# Patient Record
Sex: FEMALE | Race: BLACK OR AFRICAN AMERICAN | Employment: FULL TIME | URBAN - METROPOLITAN AREA
[De-identification: names, ages, dates, MRNs, and addresses within clinical notes are randomized per-mention and may not be internally consistent; named-entity substitution may affect disease eponyms.]

---

## 2020-04-18 ENCOUNTER — HOSPITAL ENCOUNTER (EMERGENCY)
Age: 35
Discharge: HOME | End: 2020-04-18
Attending: EMERGENCY MEDICINE
Payer: COMMERCIAL

## 2020-04-18 VITALS
SYSTOLIC BLOOD PRESSURE: 133 MMHG | HEART RATE: 97 BPM | WEIGHT: 144.4 LBS | OXYGEN SATURATION: 98 % | RESPIRATION RATE: 15 BRPM | DIASTOLIC BLOOD PRESSURE: 72 MMHG | TEMPERATURE: 98.9 F

## 2020-04-18 LAB
AMORPHOUS: ABNORMAL /HPF
BACTERIA WET PREP: ABNORMAL
BACTERIA: ABNORMAL /HPF
BASOPHILS ABSOLUTE: 0 K/UL (ref 0–0.2)
BASOPHILS RELATIVE PERCENT: 0.2 %
BILIRUBIN URINE: ABNORMAL
BLOOD, URINE: ABNORMAL
CLARITY: CLEAR
CLUE CELLS: ABNORMAL
COLOR: ABNORMAL
EOSINOPHILS ABSOLUTE: 0.2 K/UL (ref 0–0.6)
EOSINOPHILS RELATIVE PERCENT: 1.8 %
EPITHELIAL CELLS WET PREP: ABNORMAL
EPITHELIAL CELLS, UA: ABNORMAL /HPF (ref 0–5)
GLUCOSE URINE: NEGATIVE MG/DL
GONADOTROPIN, CHORIONIC (HCG) QUANT: NORMAL MIU/ML
HCG(URINE) PREGNANCY TEST: POSITIVE
HCT VFR BLD CALC: 32.6 % (ref 36–48)
HEMOGLOBIN: 10.7 G/DL (ref 12–16)
KETONES, URINE: >=80 MG/DL
LEUKOCYTE ESTERASE, URINE: NEGATIVE
LYMPHOCYTES ABSOLUTE: 2 K/UL (ref 1–5.1)
LYMPHOCYTES RELATIVE PERCENT: 20.7 %
MCH RBC QN AUTO: 25.9 PG (ref 26–34)
MCHC RBC AUTO-ENTMCNC: 32.9 G/DL (ref 31–36)
MCV RBC AUTO: 78.8 FL (ref 80–100)
MICROSCOPIC EXAMINATION: YES
MONOCYTES ABSOLUTE: 0.9 K/UL (ref 0–1.3)
MONOCYTES RELATIVE PERCENT: 8.8 %
MUCUS: ABNORMAL /LPF
NEUTROPHILS ABSOLUTE: 6.6 K/UL (ref 1.7–7.7)
NEUTROPHILS RELATIVE PERCENT: 68.5 %
NITRITE, URINE: POSITIVE
PDW BLD-RTO: 17.6 % (ref 12.4–15.4)
PH UA: 6 (ref 5–8)
PLATELET # BLD: 157 K/UL (ref 135–450)
PMV BLD AUTO: 9.3 FL (ref 5–10.5)
PROTEIN UA: 100 MG/DL
RBC # BLD: 4.14 M/UL (ref 4–5.2)
RBC UA: ABNORMAL /HPF (ref 0–4)
RBC WET PREP: ABNORMAL
SOURCE WET PREP: ABNORMAL
SPECIFIC GRAVITY UA: >=1.03 (ref 1–1.03)
TRICHOMONAS PREP: ABNORMAL
URINE TYPE: ABNORMAL
UROBILINOGEN, URINE: 1 E.U./DL
WBC # BLD: 9.6 K/UL (ref 4–11)
WBC UA: ABNORMAL /HPF (ref 0–5)
WBC WET PREP: ABNORMAL
YEAST WET PREP: ABNORMAL

## 2020-04-18 PROCEDURE — 2580000003 HC RX 258: Performed by: EMERGENCY MEDICINE

## 2020-04-18 PROCEDURE — 87210 SMEAR WET MOUNT SALINE/INK: CPT

## 2020-04-18 PROCEDURE — 87086 URINE CULTURE/COLONY COUNT: CPT

## 2020-04-18 PROCEDURE — 81001 URINALYSIS AUTO W/SCOPE: CPT

## 2020-04-18 PROCEDURE — 87491 CHLMYD TRACH DNA AMP PROBE: CPT

## 2020-04-18 PROCEDURE — 84702 CHORIONIC GONADOTROPIN TEST: CPT

## 2020-04-18 PROCEDURE — 87591 N.GONORRHOEAE DNA AMP PROB: CPT

## 2020-04-18 PROCEDURE — 85025 COMPLETE CBC W/AUTO DIFF WBC: CPT

## 2020-04-18 PROCEDURE — 99284 EMERGENCY DEPT VISIT MOD MDM: CPT

## 2020-04-18 PROCEDURE — 84703 CHORIONIC GONADOTROPIN ASSAY: CPT

## 2020-04-18 RX ORDER — 0.9 % SODIUM CHLORIDE 0.9 %
1000 INTRAVENOUS SOLUTION INTRAVENOUS ONCE
Status: COMPLETED | OUTPATIENT
Start: 2020-04-18 | End: 2020-04-18

## 2020-04-18 RX ORDER — DOXYLAMINE SUCCINATE AND PYRIDOXINE HYDROCHLORIDE, DELAYED RELEASE TABLETS 10 MG/10 MG 10; 10 MG/1; MG/1
2 TABLET, DELAYED RELEASE ORAL NIGHTLY PRN
Qty: 30 TABLET | Refills: 1 | Status: SHIPPED | OUTPATIENT
Start: 2020-04-18 | End: 2020-05-18

## 2020-04-18 RX ADMIN — SODIUM CHLORIDE 1000 ML: 0.9 INJECTION, SOLUTION INTRAVENOUS at 12:55

## 2020-04-18 ASSESSMENT — PAIN DESCRIPTION - ORIENTATION: ORIENTATION: LOWER

## 2020-04-18 ASSESSMENT — PAIN DESCRIPTION - LOCATION: LOCATION: ABDOMEN

## 2020-04-18 ASSESSMENT — PAIN SCALES - GENERAL: PAINLEVEL_OUTOF10: 3

## 2020-04-18 ASSESSMENT — PAIN DESCRIPTION - DESCRIPTORS: DESCRIPTORS: CONSTANT

## 2020-04-18 ASSESSMENT — PAIN DESCRIPTION - PAIN TYPE: TYPE: ACUTE PAIN

## 2020-04-18 NOTE — ED NOTES
Pt reports had a migraine on Monday with NV took a pregnancy test on Tues that was positive and medicated self for migraine with relief.  Kept having nausea and now has vaginal bleeding       Nemesio Mosquera RN  04/18/20 8186

## 2020-04-18 NOTE — ED PROVIDER NOTES
TRIAGE CHIEF COMPLAINT:   Chief Complaint   Patient presents with    Nausea       HPI: Inga Gongora is a 27 y.o. female who presents to the emergency department with complaint of nausea, positive pregnancy test and possible vaginal bleeding. Patient is G5, . Her last normal period was the end of February. For about 2 weeks she has had some vague nausea but thought this was mainly because she has been under stress while working at home. She decided to do a pregnancy test 4 days ago which was positive. Early last week she had a migraine headache associated with some vomiting but after taking Tylenol, ibuprofen and a triptan medication the headache resolved. She still has some nausea. This morning she noted some slight red vaginal spotting. She states she may have some slight urinary frequency but denies dysuria or flank pain. No fever or chills. She states her appetite is been decreased for about 2 weeks. She had some transient diarrhea 5 days ago. Denies current abdominal pain or pelvic pain. The patient last had vaginal delivery in 2018. She sees Dr. Annel Orellana OB/GYN at [address]:   10 systems reviewed. Pertinent positives per HPI. Otherwise noted to be negative. I have reviewed the triage/nursing documentation and agree unless otherwise noted below. PAST MEDICAL HISTORY:   Past Medical History:   Diagnosis Date    Anemia     Migraine         CURRENT MEDICATIONS:   Patient's Medications    No medications on file        SURGICAL HISTORY:   History reviewed. No pertinent surgical history. FAMILY HISTORY:   History reviewed. No pertinent family history. SOCIAL HISTORY:    reports that she has never smoked. She has never used smokeless tobacco. She reports previous alcohol use. She reports previous drug use.     ALLERGIES: No Known Allergies    PHYSICAL EXAM:  VITAL SIGNS: /72   Pulse 97   Temp 98.9 °F (37.2 °C) (Oral)   Resp 15   Wt 65.5 kg (144 at:  [facility]  [address]   Phone [phone]   MICROSCOPIC URINALYSIS - Abnormal; Notable for the following components:    Mucus, UA 3+ (*)     WBC, UA 6-10 (*)     Epithelial Cells, UA 6-10 (*)     Bacteria, UA 3+ (*)     All other components within normal limits    Narrative:     Performed at:  [facility]  [address]   Phone [phone]   CBC WITH AUTO DIFFERENTIAL - Abnormal; Notable for the following components:    Hemoglobin 10.7 (*)     Hematocrit 32.6 (*)     MCV 78.8 (*)     MCH 25.9 (*)     RDW 17.6 (*)     All other components within normal limits    Narrative:     Performed at:  [facility]  [address]   Phone [phone]   C. TRACHOMATIS N.GONORRHOEAE DNA   CULTURE, URINE   PREGNANCY, URINE    Narrative:     Performed at:  [facility]  [address]   Phone [phone]   HCG, QUANTITATIVE, PREGNANCY    Narrative:     Performed at:  [facility]  [address]   Phone [phone]       ED COURSE & MEDICAL DECISION MAKING:  Pertinent Labs & Imaging studies reviewed. (See chart for details)  67-year-old female, G5, , last normal period the end of February with 2-week history of some vague nausea with decreased appetite. She has some questionable urinary frequency but no dysuria or flank pain. She had a migraine headache 5 days ago which resolved. She still has nausea. The patient noted some slight vaginal spotting this morning. She has no pelvic or abdominal pain. No cramping. Pelvic exam shows no evidence of blood. Cervix is closed and is nontender. No adnexal mass or tenderness. No uterine tenderness. She is hemodynamically stable.   CBC shows

## 2020-04-20 ENCOUNTER — TELEPHONE (OUTPATIENT)
Dept: SURGERY | Age: 35
End: 2020-04-20

## 2020-04-20 LAB — URINE CULTURE, ROUTINE: NORMAL

## 2020-04-20 NOTE — TELEPHONE ENCOUNTER
Called Patient for follow up From ED for COVID risk call. NA, LM for patient to CB left return call number. Will attempt to reach Patient again tomorrow.

## 2020-04-21 ENCOUNTER — TELEPHONE (OUTPATIENT)
Dept: SURGERY | Age: 35
End: 2020-04-21

## 2020-04-21 LAB
C TRACH DNA GENITAL QL NAA+PROBE: NEGATIVE
N. GONORRHOEAE DNA: NEGATIVE

## 2021-02-10 LAB — PAP SMEAR, EXTERNAL: NORMAL

## 2022-03-10 ENCOUNTER — OFFICE VISIT (OUTPATIENT)
Dept: PRIMARY CARE CLINIC | Age: 37
End: 2022-03-10
Payer: COMMERCIAL

## 2022-03-10 VITALS
HEIGHT: 62 IN | RESPIRATION RATE: 16 BRPM | OXYGEN SATURATION: 96 % | DIASTOLIC BLOOD PRESSURE: 72 MMHG | WEIGHT: 142.8 LBS | TEMPERATURE: 98.1 F | SYSTOLIC BLOOD PRESSURE: 110 MMHG | HEART RATE: 80 BPM | BODY MASS INDEX: 26.28 KG/M2

## 2022-03-10 DIAGNOSIS — T83.89XA MENORRHAGIA DUE TO INTRAUTERINE DEVICE (IUD) (HCC): ICD-10-CM

## 2022-03-10 DIAGNOSIS — R22.2 MASS ON BACK: Primary | ICD-10-CM

## 2022-03-10 DIAGNOSIS — M25.512 LEFT SHOULDER PAIN, UNSPECIFIED CHRONICITY: ICD-10-CM

## 2022-03-10 DIAGNOSIS — N92.0 MENORRHAGIA DUE TO INTRAUTERINE DEVICE (IUD) (HCC): ICD-10-CM

## 2022-03-10 DIAGNOSIS — M54.2 NECK PAIN: ICD-10-CM

## 2022-03-10 PROCEDURE — 99204 OFFICE O/P NEW MOD 45 MIN: CPT | Performed by: INTERNAL MEDICINE

## 2022-03-10 RX ORDER — IBUPROFEN 600 MG/1
600 TABLET ORAL 3 TIMES DAILY PRN
COMMUNITY
Start: 2022-03-10

## 2022-03-10 SDOH — ECONOMIC STABILITY: FOOD INSECURITY: WITHIN THE PAST 12 MONTHS, YOU WORRIED THAT YOUR FOOD WOULD RUN OUT BEFORE YOU GOT MONEY TO BUY MORE.: NEVER TRUE

## 2022-03-10 SDOH — ECONOMIC STABILITY: FOOD INSECURITY: WITHIN THE PAST 12 MONTHS, THE FOOD YOU BOUGHT JUST DIDN'T LAST AND YOU DIDN'T HAVE MONEY TO GET MORE.: NEVER TRUE

## 2022-03-10 ASSESSMENT — PATIENT HEALTH QUESTIONNAIRE - PHQ9
SUM OF ALL RESPONSES TO PHQ QUESTIONS 1-9: 0
SUM OF ALL RESPONSES TO PHQ QUESTIONS 1-9: 0
3. TROUBLE FALLING OR STAYING ASLEEP: 0
9. THOUGHTS THAT YOU WOULD BE BETTER OFF DEAD, OR OF HURTING YOURSELF: 0
SUM OF ALL RESPONSES TO PHQ QUESTIONS 1-9: 0
8. MOVING OR SPEAKING SO SLOWLY THAT OTHER PEOPLE COULD HAVE NOTICED. OR THE OPPOSITE, BEING SO FIGETY OR RESTLESS THAT YOU HAVE BEEN MOVING AROUND A LOT MORE THAN USUAL: 0
5. POOR APPETITE OR OVEREATING: 0
2. FEELING DOWN, DEPRESSED OR HOPELESS: 0
6. FEELING BAD ABOUT YOURSELF - OR THAT YOU ARE A FAILURE OR HAVE LET YOURSELF OR YOUR FAMILY DOWN: 0
7. TROUBLE CONCENTRATING ON THINGS, SUCH AS READING THE NEWSPAPER OR WATCHING TELEVISION: 0
SUM OF ALL RESPONSES TO PHQ QUESTIONS 1-9: 0
SUM OF ALL RESPONSES TO PHQ9 QUESTIONS 1 & 2: 0
4. FEELING TIRED OR HAVING LITTLE ENERGY: 0
10. IF YOU CHECKED OFF ANY PROBLEMS, HOW DIFFICULT HAVE THESE PROBLEMS MADE IT FOR YOU TO DO YOUR WORK, TAKE CARE OF THINGS AT HOME, OR GET ALONG WITH OTHER PEOPLE: 0
1. LITTLE INTEREST OR PLEASURE IN DOING THINGS: 0

## 2022-03-10 ASSESSMENT — SOCIAL DETERMINANTS OF HEALTH (SDOH): HOW HARD IS IT FOR YOU TO PAY FOR THE VERY BASICS LIKE FOOD, HOUSING, MEDICAL CARE, AND HEATING?: NOT HARD AT ALL

## 2022-03-10 NOTE — PATIENT INSTRUCTIONS
1. Mass on back  - US SOFT TISSUE LIMITED AREA; Future  - Referral to Alfonzo Butler MD, General Surgery, [facility]    2. Neck pain  - XR CERVICAL SPINE (2-3 VIEWS); Future  - ibuprofen (ADVIL;MOTRIN) 600 MG tablet; Take 1 tablet by mouth 3 times daily as needed for Pain  - Referral to [facility] MD LEMUEL, Orthopedic Surgery, [facility]    3. Left shoulder pain, unspecified chronicity  - ibuprofen (ADVIL;MOTRIN) 600 MG tablet; Take 1 tablet by mouth 3 times daily as needed for Pain  -Referral to [facility] MD LEMUEL, Orthopedic Surgery, [facility]    4.  Menorrhagia due to intrauterine device (IUD) [facility])  -Referral to Bonny Montez MD, Gynecology, [facility]

## 2022-03-10 NOTE — PROGRESS NOTES
Shonna Castro   YOB: 1985    Date of Visit:  3/10/2022    Chief Complaint   Patient presents with   [address] Mass     On her back     Vaginal Bleeding     Had IUD placed 3/2021, since placement has had continually bleeding wants referral to GYN since hers retired.  Shoulder Pain     Left        HPI  Patient complains of swollen mass on her back. Patient states her  brought it to her attention over a year ago. Patient states 3 weeks ago her son told her it has increased in size. Patient states it is pronounced and she can feel it now. Patient complains of base of neck and left shoulder pain the last couple of weeks. Patient denies injury. Patient states she has been working her garden with a shovel putting flower beds in. Pain is sharp and shooting. Patient can raise her arm. Patient states sometimes when she massages her shoulder she feels a numbness or pain from shoulder to left elbow. Patient hasn't taken anything for it. Patient states she had an IUD placed March 2021. Patient states she has had continuous bleeding since it has been placed Patient states she didn't follow up because she thought it might be normal until flow increased and now her Karissa Gao is retiring and she will need another Gyn. Patient needs a referral to Gynecology. Review of Systems   Constitutional: Negative for chills, fatigue and fever. HENT: Negative for congestion, postnasal drip, rhinorrhea, sinus pressure and sore throat. Eyes: Negative for visual disturbance. Respiratory: Negative for cough, chest tightness, shortness of breath and wheezing. Cardiovascular: Negative for chest pain, palpitations and leg swelling. Gastrointestinal: Negative for abdominal pain, blood in stool, constipation, diarrhea, nausea and vomiting. Genitourinary: Positive for menstrual problem. Negative for dysuria, frequency and hematuria.    Musculoskeletal: Positive for arthralgias and neck pain. Negative for myalgias. Skin: Negative for rash. Neurological: Positive for numbness. Negative for dizziness, tremors, syncope, weakness, light-headedness and headaches. Psychiatric/Behavioral: Negative for dysphoric mood and sleep disturbance. The patient is not nervous/anxious. Past Medical History:   Diagnosis Date    Anemia     Anxiety     Migraine        History reviewed. No pertinent surgical history. No outpatient medications have been marked as taking for the 3/10/22 encounter (Office Visit) with Adrian Burgess MD.         Allergies   Allergen Reactions    Latex Hives       Social History     Tobacco Use    Smoking status: Former Smoker     Packs/day: 0.25     Years: 5.00     Pack years: 1.25     Types: Cigarettes     Quit date:      Years since quittin.2    Smokeless tobacco: Never Used   Substance Use Topics    Alcohol use: Yes     Comment: Occasional       Family History   Problem Relation Age of Onset    Diabetes Mother     Diabetes Sister     Diabetes Maternal Grandmother     Diabetes Maternal Grandfather     Heart Attack Maternal Grandfather        Immunization History   Administered Date(s) Administered    COVID-19, Pfizer Purple top, DILUTE for use, 12+ yrs, 30mcg/0.3mL dose 2021, 2021       Vitals:    03/10/22 1319   BP: 110/72   Pulse: 80   Resp: 16   Temp: 98.1 °F (36.7 °C)   SpO2: 96%   Weight: 142 lb 12.8 oz (64.8 kg)   Height: 5' 2\" (1.575 m)     Body mass index is 26.12 kg/m². Physical Exam  Nursing note reviewed. Constitutional:       General: She is not in acute distress. Appearance: Normal appearance. She is well-developed. Eyes:      General: Lids are normal.      Extraocular Movements: Extraocular movements intact. Conjunctiva/sclera: Conjunctivae normal.      Pupils: Pupils are equal, round, and reactive to light. Neck:      Thyroid: No thyromegaly. Vascular: No carotid bruit.    Cardiovascular:      Rate and Rhythm: Normal rate and regular rhythm. Heart sounds: Normal heart sounds, S1 normal and S2 normal. No murmur heard. No friction rub. No gallop. Pulmonary:      Effort: Pulmonary effort is normal. No respiratory distress. Breath sounds: Normal breath sounds. No wheezing, rhonchi or rales. Abdominal:      General: Bowel sounds are normal. There is no distension. Palpations: Abdomen is soft. Tenderness: There is no abdominal tenderness. Musculoskeletal:      Left shoulder: No tenderness. Normal range of motion. Cervical back: Neck supple. Tenderness present. No spasms. Pain with movement (pain with flexion, turning neck to right and left) present. Decreased range of motion. Right lower leg: No edema. Left lower leg: No edema. Lymphadenopathy:      Head:      Right side of head: No submandibular adenopathy. Left side of head: No submandibular adenopathy. Skin:     Comments: 10 cm x 7 cm mass right flank   Neurological:      Mental Status: She is alert and oriented to person, place, and time. Psychiatric:         Mood and Affect: Mood normal.               No results found for this visit on 03/10/22. Assessment/Plan     1. Mass on back  - possible lipoma  - US SOFT TISSUE LIMITED AREA; Future  - Referral to Milton Brunner MD, General Surgery, [facility]    2. Neck pain  - XR CERVICAL SPINE (2-3 VIEWS); Future  - ibuprofen (ADVIL;MOTRIN) 600 MG tablet; Take 1 tablet by mouth 3 times daily as needed for Pain  - Referral to [facility] MD LEMUEL, Orthopedic Surgery, [facility]    3. Left shoulder pain, unspecified chronicity  - ibuprofen (ADVIL;MOTRIN) 600 MG tablet; Take 1 tablet by mouth 3 times daily as needed for Pain  -Referral to [facility] MD LEMUEL, Orthopedic Surgery, [facility]    4.  Menorrhagia due to intrauterine device (IUD) [facility])  -Referral to Nina Barakat MD, Gynecology, [facility]      Discussed

## 2022-03-17 ENCOUNTER — APPOINTMENT (OUTPATIENT)
Dept: GENERAL RADIOLOGY | Age: 37
End: 2022-03-17
Payer: COMMERCIAL

## 2022-03-17 ENCOUNTER — HOSPITAL ENCOUNTER (OUTPATIENT)
Dept: ULTRASOUND IMAGING | Age: 37
Discharge: HOME | End: 2022-03-17
Payer: COMMERCIAL

## 2022-03-17 ENCOUNTER — HOSPITAL ENCOUNTER (OUTPATIENT)
Dept: GENERAL RADIOLOGY | Age: 37
Discharge: HOME | End: 2022-03-17
Payer: COMMERCIAL

## 2022-03-17 ENCOUNTER — HOSPITAL ENCOUNTER (OUTPATIENT)
Age: 37
Discharge: HOME | End: 2022-03-17
Payer: COMMERCIAL

## 2022-03-17 DIAGNOSIS — M54.2 NECK PAIN: ICD-10-CM

## 2022-03-17 DIAGNOSIS — R22.2 MASS ON BACK: ICD-10-CM

## 2022-03-17 PROCEDURE — 76999 ECHO EXAMINATION PROCEDURE: CPT

## 2022-03-17 PROCEDURE — 72040 X-RAY EXAM NECK SPINE 2-3 VW: CPT

## 2022-03-20 PROBLEM — M25.512 LEFT SHOULDER PAIN: Status: ACTIVE | Noted: 2022-03-20

## 2022-03-20 PROBLEM — M54.2 NECK PAIN: Status: ACTIVE | Noted: 2022-03-20

## 2022-03-20 PROBLEM — N92.0 MENORRHAGIA DUE TO INTRAUTERINE DEVICE (IUD) (HCC): Status: ACTIVE | Noted: 2022-03-20

## 2022-03-20 PROBLEM — R22.2 MASS ON BACK: Status: ACTIVE | Noted: 2022-03-20

## 2022-03-20 PROBLEM — T83.89XA MENORRHAGIA DUE TO INTRAUTERINE DEVICE (IUD) (HCC): Status: ACTIVE | Noted: 2022-03-20

## 2022-03-20 ASSESSMENT — ENCOUNTER SYMPTOMS
ABDOMINAL PAIN: 0
NAUSEA: 0
SORE THROAT: 0
CONSTIPATION: 0
VOMITING: 0
RHINORRHEA: 0
SINUS PRESSURE: 0
SHORTNESS OF BREATH: 0
CHEST TIGHTNESS: 0
BLOOD IN STOOL: 0
COUGH: 0
WHEEZING: 0
DIARRHEA: 0

## 2022-03-21 ENCOUNTER — OFFICE VISIT (OUTPATIENT)
Dept: SURGERY | Age: 37
End: 2022-03-21
Payer: COMMERCIAL

## 2022-03-21 VITALS
BODY MASS INDEX: 27.12 KG/M2 | SYSTOLIC BLOOD PRESSURE: 113 MMHG | HEART RATE: 102 BPM | DIASTOLIC BLOOD PRESSURE: 73 MMHG | HEIGHT: 62 IN | WEIGHT: 147.4 LBS | TEMPERATURE: 97.3 F

## 2022-03-21 DIAGNOSIS — M79.89 SOFT TISSUE MASS: Primary | ICD-10-CM

## 2022-03-21 PROCEDURE — 99203 OFFICE O/P NEW LOW 30 MIN: CPT | Performed by: SURGERY

## 2022-03-21 NOTE — PROGRESS NOTES
PATIENT NAME: Brady Bend OF BIRTH: 1985     TODAY'S DATE: 3/21/2022    Reason for Visit:  Soft tissue mass of the left back    Requesting Physician:  Dr. Marcell Rodriguez:              The patient is a 39 y.o. female with a PMHx as delineated below who presents with a long history of a soft tissue mass of the left back. This has slowly increased in size and associated with mild discomfort. Chief Complaint   Patient presents with   [address] [facility] Patient     mass on back ultrasound 3/17       REVIEW OF SYSTEMS:  CONSTITUTIONAL:  negative  HEENT:  negative  RESPIRATORY:  negative  CARDIOVASCULAR:  negative  GASTROINTESTINAL:  negative  GENITOURINARY:  negative  HEMATOLOGIC/LYMPHATIC:  negative  MUSCULOSKELETAL: negative  NEUROLOGICAL:  negative    PMH  Past Medical History:   Diagnosis Date    Anemia     Anxiety     Migraine        PSH  No past surgical history on file.     Social History  Social History     Socioeconomic History    Marital status: Single     Spouse name: Not on file    Number of children: Not on file    Years of education: Not on file    Highest education level: Not on file   Occupational History    Not on file   Tobacco Use    Smoking status: Former Smoker     Packs/day: 0.25     Years: 5.00     Pack years: 1.25     Types: Cigarettes     Quit date:      Years since quittin.2    Smokeless tobacco: Never Used   Substance and Sexual Activity    Alcohol use: Yes     Comment: Occasional    Drug use: Never    Sexual activity: Yes   Other Topics Concern    Not on file   Social History Narrative    Not on file     Social Determinants of Health     Financial Resource Strain: Low Risk     Difficulty of Paying Living Expenses: Not hard at all   Food Insecurity: No Food Insecurity    Worried About [address] [facility] in the Last Year: Never true    Elliot of Food in the Last Year: Never true   Transportation Needs:     Lack of Transportation (Medical): Not on file    Lack of Transportation (Non-Medical):  Not on file   Physical Activity:     Days of Exercise per Week: Not on file    Minutes of Exercise per Session: Not on file   Stress:     Feeling of Stress : Not on file   Social Connections:     Frequency of Communication with Friends and Family: Not on file    Frequency of Social Gatherings with Friends and Family: Not on file    Attends Latter day Services: Not on file    Active Member of [address] [place] [address] [employer] or Organizations: Not on file    Attends Club or Organization Meetings: Not on file    Marital Status: Not on file   Intimate Partner Violence:     Fear of Current or Ex-Partner: Not on file    Emotionally Abused: Not on file    Physically Abused: Not on file    Sexually Abused: Not on file   Housing Stability:     Unable to Pay for Housing in the Last Year: Not on file    Number of [place] in the Last Year: Not on file    Unstable Housing in the Last Year: Not on file       Family History:       Problem Relation Age of Onset    Diabetes Mother     Diabetes Sister     Diabetes Maternal Grandmother     Diabetes Maternal Grandfather     Heart Attack Maternal Grandfather        Allergy:   Allergies   Allergen Reactions    Latex Hives       PHYSICAL EXAM:  VITALS:  /73   Pulse 102   Temp 97.3 °F (36.3 °C)   Ht 5' 2\" (1.575 m)   Wt 147 lb 6.4 oz (66.9 kg)   BMI 26.96 kg/m²     CONSTITUTIONAL:  alert, no apparent distress and normal weight  EYES:  sclera clear  ENT:  normocepalic, without obvious abnormality  NECK:  supple, symmetrical, trachea midline and no carotid bruits  LUNGS:  clear to auscultation  CARDIOVASCULAR:  regular rate and rhythm and no murmur noted  ABDOMEN:  no scars, normal bowel sounds, soft, non-distended, non-tender, voluntary guarding absent, no masses palpated and hernia absent  MUSCULOSKELETAL:  0+ pitting edema lower extremities  NEUROLOGIC:  Mental Status Exam:  Level of Alertness: awake  Orientation:   person, place, time  SKIN:  Over the upper left back, there is a a soft tissue mass 5-7 cm most consistent with a lipoma    Radiology Review:    FINDINGS:   Area of interest scanning right mid back underlying the scapular region with   soft tissues evaluation demonstrating a somewhat lobulated area 7 x 1.6 x 5.8   cm isoechoic to subcutaneous fat concerning for lipomatous involvement or   lipoma formation without complex features of internal abnormal flow or   hyperemia.  No adjacent fluid collection or inflammation. IMPRESSION/RECOMMENDATIONS:    The patient has a soft tissue mass of the left back. This by exam is most consistent with a lipoma. We discussed the options of excision vs observation and have elected to to proceed with excision. The risks and benefits of surgery were discussed with the patient and she wishes to proceed.     Jaylen Connolly MD

## 2022-03-21 NOTE — LETTER
[facility] of Nuvia Lopez M.D. [facility]  [address]  [address] [facility]  Phone: [phone] Fax: [phone]            Surgery Order/Time:  3/21/22/2:15 PM  Conf. # _________________  Scheduled by: Edna Pak Lpn                                **Pre-Surgical Orders in [facility]**  [facility]ility: [facility]    Surgery Date: May 4, 2022 Time: 1030am    Pt arrival: 0830  Second Surgeon: N/A        Patient Name: Sharad Barrientos  : 1985  Home Ph:[phone] (home)  [place] Bennett [address] [place] [address]  PCP:  Flor Grier MD   Does patient speak English?: yes    needed? no                                             PROCEDURE: Excision of soft tissue mass of left upper back  CPT: 48675 excision soft tissue mass of back or flank   DIAGNOSIS:      ICD-10-CM    1. Soft tissue mass  M79.89        Anesthesia: Local  Time Needed:  30 minutes   Pt Position:  lateral, left side up      Outpatient __x__ Admit ______  Assist._____   Cardiac Clearance: no  Patient to meet with Anesthesiology prior to surgery: no    Patient Allergies: Allergies   Allergen Reactions    Latex Hives     Pre-Op H&P to be done by: Flor Grier MD  Pre-Op Antibiotics: Ancef: 2g IV On Call to OR      Physician: Amos Casiano MD Date: 22             Insurance:     ID #      Ph #   Date called ________________   Dakota Bernabe to: _____________ Precert Needed?  Yes  /  No  PreAuth # & Details   ______________________________________________________________________

## 2022-03-24 ENCOUNTER — TELEPHONE (OUTPATIENT)
Dept: SURGERY | Age: 37
End: 2022-03-24

## 2022-04-01 NOTE — TELEPHONE ENCOUNTER
Patient called to schedule surgery for surgical removal of lipoma.      Please call back at [phone]

## 2022-04-01 NOTE — TELEPHONE ENCOUNTER
Patient seen on 3/21/22 surgery letter received Excision of soft tissue mass  30 min OR time needed under local    Covid vaccinated     Pre op instructions reviewed including the need for a H&P.   Pre op packet emailed to [email or web address]    Post op appt scheduled     Faxed to scheduling     Placed on [facility] and [place]

## 2022-04-13 ENCOUNTER — TELEPHONE (OUTPATIENT)
Dept: SURGERY | Age: 37
End: 2022-04-13

## 2022-04-13 NOTE — TELEPHONE ENCOUNTER
Rosana Guerra called to speak to Celine to verify CPT for surgery on 5/4/2022. Please call at [phone].  If she's unavailable, please leave a voicemail with the correct CPT code

## 2022-04-25 ENCOUNTER — OFFICE VISIT (OUTPATIENT)
Dept: ORTHOPEDIC SURGERY | Age: 37
End: 2022-04-25
Payer: COMMERCIAL

## 2022-04-25 VITALS — BODY MASS INDEX: 27.06 KG/M2 | HEIGHT: 62 IN | WEIGHT: 147.05 LBS

## 2022-04-25 DIAGNOSIS — S16.1XXA STRAIN OF NECK MUSCLE, INITIAL ENCOUNTER: Primary | ICD-10-CM

## 2022-04-25 DIAGNOSIS — M50.20 CERVICAL DISCOGENIC PAIN SYNDROME: ICD-10-CM

## 2022-04-25 PROCEDURE — 99203 OFFICE O/P NEW LOW 30 MIN: CPT | Performed by: INTERNAL MEDICINE

## 2022-04-25 RX ORDER — CYCLOBENZAPRINE HCL 10 MG
10 TABLET ORAL NIGHTLY PRN
Qty: 30 TABLET | Refills: 1 | Status: SHIPPED | OUTPATIENT
Start: 2022-04-25

## 2022-04-25 RX ORDER — METHYLPREDNISOLONE 4 MG/1
TABLET ORAL
Qty: 1 KIT | Refills: 0 | Status: SHIPPED | OUTPATIENT
Start: 2022-04-25

## 2022-04-25 NOTE — LETTER
[facility] [address] [place] [address]  Phone: [phone]  Fax: [phone]           Gume Chung MD      April 25, 2022     Patient: Paulette Madden   MR Number: [id number]   YOB: 1985   Date of Visit: 4/25/2022       Dear Dr. Tayo Mensah: Thank you for referring Leonel Marie to me for evaluation/treatment. Below are the relevant portions of my assessment and plan of care. Impression: . Encounter Diagnosis   Name Primary?  Strain of neck muscle, initial encounter Yes        Narrative   EXAMINATION:   4 XRAY VIEWS OF THE CERVICAL SPINE       3/17/2022 1:49 pm       COMPARISON:   None.       HISTORY:   ORDERING SYSTEM PROVIDED HISTORY: Neck pain   TECHNOLOGIST PROVIDED HISTORY:   Reason for exam:->neck pain       FINDINGS:   AP and lateral views reveal normal alignment of the cervical lordotic   curvature without focal subluxation listhesis.  Vertebral body heights are   preserved.  No abnormal prevertebral soft tissue swelling or abnormality the   lung apices.  Lateral masses of C1 well seated on C2.  Odontoid process   within normal limits           Impression   No acute osseous findings of the cervical spine.  No advanced degenerative   changes           Plan: Activity modification cervical disc protocol  MRI evaluation cervical spine evaluate severity of discopathy suspected clinically  Cervical stabilization home exercise program  Medrol Dosepak as needed use of Flexeril    The nature of the finding, probable diagnosis and likely treatment was thoroughly discussed with the patient. The options, risks, complications, alternative treatment as well as some of the differential diagnosis was discussed. The patient was thoroughly informed and all questions were answered. the patient indicated understanding and satisfaction with the discussion.       Orders:        Orders Placed This Encounter   Procedures    MRI CERVICAL SPINE WO CONTRAST Standing Status:   Future     Standing Expiration Date:   4/25/2023     Scheduling Instructions:      [facility], please call pt to schedule, [address] will obtain auth and fwd to your facility. Pt advised to f/u in clinic 2-3 days after MRI for results. Order Specific Question:   Reason for exam:     Answer:   R/O HNP / STENOSIS     Order Specific Question:   What is the sedation requirement? Answer:   None           Disclaimer: \"This note was dictated with voice recognition software. Though review and correction are routine, we apologize for any errors. \"           If you have questions, please do not hesitate to call me. I look forward to following Pau along with you.     Sincerely,        Barb Madden MD    CC providers:  Fredi Luther MD  [address] [facility] [address]  [place] [address]  Via [address] [employer]

## 2022-04-25 NOTE — PROGRESS NOTES
PRE-OP INSTRUCTIONS FOR THE SURGICAL PATIENT YOU ARE UNABLE TO MAKE CONTACT FOR AN INTERVIEW:        1. Follow all instructions provided to you from your surgeons office, including your ARRIVAL TIME. 2. Enter the MAIN entrance located on [address] and report to the desk. 3. Bring your insurance & photo ID with you. You may also be asked to pay a co-pay, as you may want to bring a check or credit card with you. 4. Leave all other valuables at home. 5. Arrange for someone to drive you home and be with you for the first 24 hours after discharge. 6. Bring your medication list with you day of surgery with doses and frequency listed (including over the counter medications)  7. You must contact your surgeon for Instructions regarding:              - ALL medication instructions, especially if taking blood thinners, aspirin, or diabetic medication.         -Bariatric patients call surgeon if on diabetic medications as some need to be stopped 1 week preop  - IF  there is a change in your physical condition such as a cold, fever, rash, cuts, sores or any other infection, especially near your surgical site. 8. A Pre-op History and Physical for surgery MUST be completed by your Physician or an Urgent Care within 30 days of your procedure date. Please bring a copy with you on the day of your procedure and along with any other testing performed. 9. DO NOT EAT ANYTHING eight hours prior to arrival for surgery. May have up to 8 ounces of water 4 hours prior to arrival for surgery. NOTE: ALL Gastric, Bariatric and Bowel surgery patients MUST follow their surgeon's instructions. 10. No gum, candy, mints, or ice chips day of procedure. 11. Please refrain from drinking alcohol the day before or day of your procedure. 12. Please do not smoke the day of your procedure. 13. Dress in loose, comfortable clothing appropriate for redressing after your procedure.  Do not wear jewelry (including body piercings), make-up, fingernail polish, lotion, powders or metal hairclips. 15. Contacts will need to be removed prior to surgery. You may want to bring your eye glasses to wear immediately before and after surgery. 14. Dentures will need to be removed before your procedure. 13. Bring cases for your glasses, contacts, dentures, or hearing aids to protect them while you are in surgery. 16. If you use a CPAP, please bring it with you on the day of your procedure. 17. Do not shave or wax for 72 hours prior to procedure near your operative site  18. FOR WOMAN OF CHILDBEARING AGE ONLY- please make sure we can collect a urine sample on arrival.     If you have further questions, you may contact your surgeon's office or us at [phone]     Left instructions on patient's voicemail.     Cynthia Lange RN.4/25/2022 .8:39 AM

## 2022-04-25 NOTE — PROGRESS NOTES
Chief Complaint:   Chief Complaint   Patient presents with    Neck Pain     neck pain started about 6 months ago and has progressively gotten worse, feels tightness/stabbing/ and achiness throughout the day, has difficulty turning head in either direction          History of Present Illness:       Patient is a 39 y.o. female presents with the above complaint. She is seen in consultation at the request of Adam Zamarripa. The symptoms began 6 monthsago and started without an injury. The patient describes a aching, tightness pain that does radiate. The symptoms are intermittent  and are are worsening since the onset. The neck pain is location: left and is worsened by turning left. The patient has not noted new onset or progressive weakness of the upper extremites. The neck pain : arm pain is 50: 50 and does not follows a specific dermatomal pattern. Treatment to date has included none and OTC NSAIDs and massage and symptoms have not improved with this treatment. The patient denies history of neck trauma. Their is not history or orthopaedic cervical spine surgery. Work up to date has included: X-ray which is outlined below. The patient has no prior history of autoimmune disease, inflammatory arthropathy or crystal arthropathy. Past Medical History:        Past Medical History:   Diagnosis Date    Anemia     Anxiety     Migraine        No past surgical history on file. Present Medications:         Current Outpatient Medications   Medication Sig Dispense Refill    ibuprofen (ADVIL;MOTRIN) 600 MG tablet Take 1 tablet by mouth 3 times daily as needed for Pain       No current facility-administered medications for this visit. Allergies:         Allergies   Allergen Reactions    Latex Hives        Social History:         Social History     Socioeconomic History    Marital status: Single     Spouse name: Not on file    Number of children: Not on file    Years of education: Not on file    Highest education level: Not on file   Occupational History    Not on file   Tobacco Use    Smoking status: Former Smoker     Packs/day: 0.25     Years: 5.00     Pack years: 1.25     Types: Cigarettes     Quit date:      Years since quittin.3    Smokeless tobacco: Never Used   Substance and Sexual Activity    Alcohol use: Yes     Comment: Occasional    Drug use: Never    Sexual activity: Yes   Other Topics Concern    Not on file   Social History Narrative    Not on file     Social Determinants of Health     Financial Resource Strain: Low Risk     Difficulty of Paying Living Expenses: Not hard at all   Food Insecurity: No Food Insecurity    Worried About [address] in the Last Year: Never true    [address] in the Last Year: Never true   Transportation Needs:     Lack of Transportation (Medical): Not on file    Lack of Transportation (Non-Medical):  Not on file   Physical Activity:     Days of Exercise per Week: Not on file    Minutes of Exercise per Session: Not on file   Stress:     Feeling of Stress : Not on file   Social Connections:     Frequency of Communication with Friends and Family: Not on file    Frequency of Social Gatherings with Friends and Family: Not on file    Attends [employer] Services: Not on file    Active Member of [address] [place] [address] or Organizations: Not on file    Attends Club or Organization Meetings: Not on file    Marital Status: Not on file   Intimate Partner Violence:     Fear of Current or Ex-Partner: Not on file    Emotionally Abused: Not on file    Physically Abused: Not on file    Sexually Abused: Not on file   Housing Stability:     Unable to Pay for Housing in the Last Year: Not on file    Number of [place] in the Last Year: Not on file    Unstable Housing in the Last Year: Not on file        Review of Symptoms:    Pertinent items are noted in HPI    Review of systems reviewed from Patient History Form dated on today's date and   available in the patient's chart under the Media tab. Vital Signs: There were no vitals filed for this visit. General Exam:     Constitutional: Patient is adequately groomed with no evidence of malnutrition  Mental Status: The patient is oriented to time, place and person. The patient's mood and affect are appropriate. Vascular: Examination reveals no swelling or calf tenderness. Peripheral pulses are palpable and 2+. Lymphatics: no lymphadenopathy of the inguinal region or lower extremity      Physical Exam: Cervical neck      Primary Exam:    Inspection: No deformity atrophy appreciable curvature      Palpation: Mild tenderness left upper trapezius and left posterior triangle soft tissues      Range of Motion: Mild to moderate restriction with flexion mild restriction with extension, asymmetric restriction with rotation to the left as compared to right associate with pain with rotation to the left      Strength: Normal upper extremity      Special Tests: Spurling sign equivocal left      Skin: There are no rashes, ulcerations or lesions. Gait: Non-ataxic      Reflex intact upper     Additional Comments:        Additional Examinations:         Neurologic -Light touch sensation and manual muscle testing is normal C5-C8 . Biceps and triceps reflexes are symmetric and +2. Spurlling sign is negative            Office Imaging Results/Procedures PerformedToday:             Office Procedures:     Orders Placed This Encounter   Procedures    MRI CERVICAL SPINE WO CONTRAST     Standing Status:   Future     Standing Expiration Date:   4/25/2023     Scheduling Instructions:      Cata Dodd, please call pt to schedule, [address] will obtain auth and fwd to your facility. Pt advised to f/u in clinic 2-3 days after MRI for results.      Order Specific Question:   Reason for exam:     Answer:   R/O HNP / STENOSIS     Order Specific Question:   What is the Specific Question:   Reason for exam:     Answer:   R/O HNP / STENOSIS     Order Specific Question:   What is the sedation requirement? Answer:   None           Disclaimer: \"This note was dictated with voice recognition software. Though review and correction are routine, we apologize for any errors. \"

## 2022-04-25 NOTE — PROGRESS NOTES
Place patient label inside box (if no patient label, complete below)  Name:  :  MR#:   [facility] / PROCEDURE  1. I (we), Maria D Saunders authorize DR Annette Worrell and/or such assistants as may be selected by him/her, to perform the following operation/procedure(s): EXCISION OF SOFT TISSUE MASS OF LEFT UPPER BACK        Note: If unable to obtain consent prior to an emergent procedure, document the emergent reason in the medical record. This procedure has been explained to my (our) satisfaction and included in the explanation was:  A) The intended benefit, nature, and extent of the procedure to be performed;  B) The significant risks involved and the probability of success;  C) Alternative procedures and methods of treatment;  D) The dangers and probable consequences of such alternatives (including no procedure or treatment); E) The expected consequences of the procedure on my future health;  F) Whether other qualified individuals would be performing important surgical tasks and/or whether  would be present to advise or support the procedure. I (we) understand that there are other risks of infection and other serious complications in the pre-operative/procedural and postoperative/procedural stages of my (our) care. I (we) have asked all of the questions which I (we) thought were important in deciding whether or not to undergo treatment or diagnosis. These questions have been answered to my (our) satisfaction. I (we) understand that no assurance can be given that the procedure will be a success, and no guarantee or warranty of success has been given to me (us). 2. It has been explained to me (us) that during the course of the operation/procedure, unforeseen conditions may be revealed that necessitate extension of the original procedure(s) or different procedure(s) than those set forth in Paragraph 1.  I (we) authorize and request that the above-named physician, his/her assistants or his/her designees, perform procedures as necessary and desirable if deemed to be in my (our) best interest.     Revised 8/2/2021                                                                          Page 1 of 2           3. I acknowledge that health care personnel may be observing this procedure for the purpose of medical education or other specified purposes as may be necessary as requested and/or approved by my (our) physician. 4. I (we) consent to the disposal by the hospital Pathologist of the removed tissue, parts or organs in accordance with hospital policy. 5. I do ____ do not ____ consent to the use of a local infiltration pain blocking agent that will be used by my provider/surgical provider to help alleviate pain during my procedure. 6. I do ____ do not ____ consent to an emergent blood transfusion in the case of a life-threatening situation that requires blood components to be administered. This consent is valid for 24 hours from the beginning of the procedure. 7. This patient does ____ or does not ____ currently have a DNR status/order. If DNR order is in place, obtain Addendum to the Surgical Consent for ALL Patients with a DNR Order to address duane-operative status for limited intervention or DNR suspension.      8. I have read and fully understand the above Consent for Operation/Procedure and that all blanks were completed before I signed the consent.   _____________________________       _____________________      ____/____am/pm  Signature of Patient or legal representative      Printed Name / Relationship            Date / Time   ____________________________       _____________________      ____/____am/pm  Witness to Signature                                    Printed Name                    Date / Time     If patient is unable to sign or is a minor, complete the following)  Patient is a minor, ____ years of age, or unable to sign because:   ______________________________________________________________________________________________    Bernestine Christian If a phone consent is obtained, consent will be documented by using two health care professionals, each affirming that the consenting party has no questions and gives consent for the procedure discussed with the physician/provider.   _____________________          ____________________       _____/_____am/pm   2nd witness to phone consent        Printed name           Date / Time    Informed Consent:  I have provided the explanation described above in section 1 to the patient and/or legal representative.  I have provided the patient and/or legal representative with an opportunity to ask any questions about the proposed operation/procedure.   ___________________________          ____________________         ____/____am/pm  Provider / Proceduralist                            Printed name            Date / Time  Revised 8/2/2021                                                                      Page 2 of 2

## 2022-05-02 NOTE — PROGRESS NOTES
Spoke to pt, informed she is needing a preop physical for surgery 5/4. Pt states she will try to get appt with PCP or will go to an urgent care[facility]. Fax number given to pt to have sent when done and also instructed pt to obtain copy for self and bring DOS.  Zakiya Jordan

## 2022-05-04 ENCOUNTER — HOSPITAL ENCOUNTER (OUTPATIENT)
Age: 37
Setting detail: OUTPATIENT SURGERY
Discharge: HOME | End: 2022-05-04
Attending: SURGERY | Admitting: SURGERY
Payer: COMMERCIAL

## 2022-05-04 VITALS
BODY MASS INDEX: 26.72 KG/M2 | DIASTOLIC BLOOD PRESSURE: 78 MMHG | RESPIRATION RATE: 16 BRPM | TEMPERATURE: 96.8 F | HEIGHT: 62 IN | WEIGHT: 145.2 LBS | SYSTOLIC BLOOD PRESSURE: 103 MMHG | HEART RATE: 65 BPM | OXYGEN SATURATION: 97 %

## 2022-05-04 DIAGNOSIS — M79.89 SOFT TISSUE MASS: ICD-10-CM

## 2022-05-04 LAB — PREGNANCY, URINE: NEGATIVE

## 2022-05-04 PROCEDURE — 3600000003 HC SURGERY LEVEL 3 BASE: Performed by: SURGERY

## 2022-05-04 PROCEDURE — 7100000010 HC PHASE II RECOVERY - FIRST 15 MIN: Performed by: SURGERY

## 2022-05-04 PROCEDURE — 2580000003 HC RX 258: Performed by: SURGERY

## 2022-05-04 PROCEDURE — 7100000011 HC PHASE II RECOVERY - ADDTL 15 MIN: Performed by: SURGERY

## 2022-05-04 PROCEDURE — 2500000003 HC RX 250 WO HCPCS: Performed by: SURGERY

## 2022-05-04 PROCEDURE — 88304 TISSUE EXAM BY PATHOLOGIST: CPT

## 2022-05-04 PROCEDURE — 3600000013 HC SURGERY LEVEL 3 ADDTL 15MIN: Performed by: SURGERY

## 2022-05-04 PROCEDURE — 84703 CHORIONIC GONADOTROPIN ASSAY: CPT

## 2022-05-04 PROCEDURE — 2709999900 HC NON-CHARGEABLE SUPPLY: Performed by: SURGERY

## 2022-05-04 PROCEDURE — 21931 EXC BACK LES SC 3 CM/>: CPT | Performed by: SURGERY

## 2022-05-04 RX ORDER — SODIUM CHLORIDE, SODIUM LACTATE, POTASSIUM CHLORIDE, CALCIUM CHLORIDE 600; 310; 30; 20 MG/100ML; MG/100ML; MG/100ML; MG/100ML
INJECTION, SOLUTION INTRAVENOUS CONTINUOUS
Status: DISCONTINUED | OUTPATIENT
Start: 2022-05-04 | End: 2022-05-04 | Stop reason: HOSPADM

## 2022-05-04 RX ORDER — BUPIVACAINE HYDROCHLORIDE 5 MG/ML
INJECTION, SOLUTION EPIDURAL; INTRACAUDAL PRN
Status: DISCONTINUED | OUTPATIENT
Start: 2022-05-04 | End: 2022-05-04 | Stop reason: ALTCHOICE

## 2022-05-04 RX ORDER — OXYCODONE HYDROCHLORIDE 5 MG/1
5 TABLET ORAL EVERY 6 HOURS PRN
Qty: 8 TABLET | Refills: 0 | Status: SHIPPED | OUTPATIENT
Start: 2022-05-04 | End: 2022-05-06

## 2022-05-04 RX ADMIN — SODIUM CHLORIDE, POTASSIUM CHLORIDE, SODIUM LACTATE AND CALCIUM CHLORIDE: 600; 310; 30; 20 INJECTION, SOLUTION INTRAVENOUS at 09:57

## 2022-05-04 ASSESSMENT — PAIN - FUNCTIONAL ASSESSMENT: PAIN_FUNCTIONAL_ASSESSMENT: NONE - DENIES PAIN

## 2022-05-04 ASSESSMENT — PAIN SCALES - GENERAL: PAINLEVEL_OUTOF10: 0

## 2022-05-04 NOTE — BRIEF OP NOTE
Brief Postoperative Note      Patient: Lance Fonseca  YOB: 1985  MRN: [id number]    Date of Procedure: 5/4/2022    Pre-Op Diagnosis: Soft tissue mass [M79.89]    Post-Op Diagnosis: Same       Procedure(s):  EXCISION OF SOFT TISSUE MASS OF RIGHT UPPER BACK    Surgeon(s):  Junie Bence, MD    Assistant:  Angel Enriquez DO    Anesthesia: Local    Estimated Blood Loss (mL): 20    Complications: None    Specimens:   ID Type Source Tests Collected by Time Destination   A : soft tissue mass right back Tissue Tissue SURGICAL PATHOLOGY Junie Bence, MD 5/4/2022 1025        Implants:  * No implants in log *      Drains: * No LDAs found *    Findings: Soft tissue mass consistent with Lipoma measuring 7 cm by 5 cm    Electronically signed by Angel Enriquez DO on 5/4/2022 at 10:50 AM

## 2022-05-04 NOTE — H&P
[facility]    [id number]    [facility] Same Day Surgery Update H & P  Department of General Surgery   Surgical Service   Pre-operative History and Physical  Last H & P within the last 30 days. DIAGNOSIS:   Soft tissue mass [M79.89]    Procedure(s):  EXCISION OF SOFT TISSUE MASS OF LEFT UPPER BACK    History obtained from: Patient interview and EHR      HISTORY OF PRESENT ILLNESS:   The patient is a 39 y.o. female with soft tissue mass of the left upper back presents today for the above procedure. Illness Screening: Patient denies fever, chills, worsening cough, or close contact with sick individuals. Past Medical History:        Diagnosis Date    Anemia     Anxiety     Migraine      Past Surgical History:    Past surgical history reviewed. No pertinent past surgical history. Medications Prior to Admission:      Prior to Admission medications    Medication Sig Start Date End Date Taking? Authorizing Provider   methylPREDNISolone (MEDROL, SOPHIA,) 4 MG tablet By mouth. 4/25/22   Dustin Jorge MD   cyclobenzaprine (FLEXERIL) 10 MG tablet Take 1 tablet by mouth nightly as needed for Muscle spasms 4/25/22   Dustin Jorge MD   ibuprofen (ADVIL;MOTRIN) 600 MG tablet Take 1 tablet by mouth 3 times daily as needed for Pain 3/10/22   Edouard Driver MD         Allergies:  Latex    PHYSICAL EXAM:      /81   Pulse 87   Temp 98.2 °F (36.8 °C) (Temporal)   Resp 16   Ht 5' 2\" (1.575 m)   Wt 145 lb 3.2 oz (65.9 kg)   SpO2 99%   BMI 26.56 kg/m²      Airway:  Airway patent with no audible stridor    Heart:  Regular rate and rhythm, No murmur noted    Lungs:  No increased work of breathing, good air exchange, clear to auscultation bilaterally, no crackles or wheezing    Abdomen:  Soft, non-distended, non-tender, no rebound tenderness or guarding, and no masses palpated    ASSESSMENT AND PLAN     Patient is a 39 y.o. female with above specified procedure planned.     1. The patients history and physical was obtained and signed off by the pre-admission testing department. Patient seen and focused exam done today- no new changes since last physical exam on 5/3/22    2. Access to ancillary services are available per request of the provider.     ISMAEL Smith - JOLLY     5/4/2022

## 2022-05-04 NOTE — OP NOTE
Operative Note      Patient: Glen Dennis  YOB: 1985  MRN: [id number]    Date of Procedure: 5/4/2022    Pre-Op Diagnosis: Soft tissue mass [M79.89]    Post-Op Diagnosis: Same       Procedure(s):  EXCISION OF SOFT TISSUE MASS OF RIGHT UPPER BACK    Surgeon(s):  Kristen Philip MD    Assistant:   Jewel Villasenor DO    Anesthesia: Local    Estimated Blood Loss (mL): 20    Complications: None    Specimens:   ID Type Source Tests Collected by Time Destination   A : soft tissue mass right back Tissue Tissue SURGICAL PATHOLOGY Kristen Philip MD 5/4/2022 1025      Implants:  None      Drains: None    Findings: Soft tissue mass consistent with Lipoma measuring 7 cm by 5 cm    Detailed Description of Procedure: Indications: The patient is a 25-year-old female without significant medical history who presented to the clinic with complaints of a soft tissue mass on her lower back. Due to recent increase in size and discomfort, she requested intervention. After discussing indications, risks, benefits, and alternatives, the patient elected to proceed with surgicl removal.    Procedure Details:    After informed consent was obtained, the patient was brought to the operating room and placed on the operating table. She was secured in place lying right lateral decubitus using a sand bag and safety straps. All extremities were appropriately padded. The right lower back was prepped and draped in standard fashion. A timeout was performed according to hospital protocol. One-half percent Marcaine was used to anesthetize the skin surrounding the lesion. A transverse incision was made over the lesion using a 15 blade. The incision was carried down to the subcutaneous tissue using electrocautery. A combination of sharp and blunt dissection using electtrocautery and a hemostat was used to mobilize the mass which was in a subcutaneous location. Once the mass was removed, hemostasis was achieved with cautery.   The wound was irrigated and confirmed to be hemostatic. Closure was achieved with interrupted 3-0 vicryl sutures in a subcuticular fashion. A prineo dressing was applied over the incision. At the end of the operation, all sponge, instrument, and needle counts were correct x2. The patient tolerated the procedure well and was taken back to Same Day Surgery in stable condition, there were no immediate complications. Dr. Diamond Carlos was present and scrubbed for the entirety of the operation and directed its course from beginning to end.       Electronically signed by Jennifer Fine DO on 5/4/2022 at 11:11 AM

## 2022-05-04 NOTE — PROGRESS NOTES
Ambulatory Surgery/Procedure Discharge Note    Vitals:    05/04/22 1057   BP: 103/78   Pulse: 65   Resp: 16   Temp: 96.8 °F (36 °C)   SpO2: 97%       In: 200 [P.O.:200]  Out: -     Restroom use offered before discharge. Yes    Pain assessment:  level of pain (1-10, 10 severe),   Pain Level: 0    Tolerates po well  Inc well approx with transparent Drsg to back D&I  DC instructions given to pt and S. O. with verbalization of understanding of pt and S.O. and both states 'ready to go home'    Patient discharged to home/self care.  Patient discharged via wheel chair by transporter to waiting family/S.O.       5/4/2022 11:17 AM

## 2022-05-16 ENCOUNTER — OFFICE VISIT (OUTPATIENT)
Dept: SURGERY | Age: 37
End: 2022-05-16

## 2022-05-16 VITALS
SYSTOLIC BLOOD PRESSURE: 122 MMHG | HEART RATE: 92 BPM | DIASTOLIC BLOOD PRESSURE: 74 MMHG | WEIGHT: 144.2 LBS | BODY MASS INDEX: 26.54 KG/M2 | HEIGHT: 62 IN

## 2022-05-16 DIAGNOSIS — Z09 POSTOP CHECK: Primary | ICD-10-CM

## 2022-05-16 PROCEDURE — 99024 POSTOP FOLLOW-UP VISIT: CPT | Performed by: SURGERY

## 2022-05-16 NOTE — PROGRESS NOTES
Department of General Surgery - Adult  General Surgery Service  Resident Clinic Note      CC:  Post-Operative Visit    History Obtained From:  patient    HISTORY OF PRESENT ILLNESS:  This is a 39 y.o. female with Hx of as below who presents for follow-up after excision of soft tissue mass from her right upper back (). She has no complaints. Past Medical History:   Diagnosis Date    Anemia     Anxiety     Migraine      Past Surgical History:   Procedure Laterality Date    SKIN BIOPSY Right 2022    EXCISION OF SOFT TISSUE MASS OF RIGHT UPPER BACK performed by Beck Luther MD at [address]     Current Outpatient Medications   Medication Sig Dispense Refill    methylPREDNISolone (MEDROL, SOPHIA,) 4 MG tablet By mouth. 1 kit 0    cyclobenzaprine (FLEXERIL) 10 MG tablet Take 1 tablet by mouth nightly as needed for Muscle spasms 30 tablet 1    ibuprofen (ADVIL;MOTRIN) 600 MG tablet Take 1 tablet by mouth 3 times daily as needed for Pain       No current facility-administered medications for this visit. Allergies   Allergen Reactions    Latex Hives     Social History     Tobacco Use    Smoking status: Former Smoker     Packs/day: 0.25     Years: 5.00     Pack years: 1.25     Types: Cigarettes     Quit date:      Years since quittin.3    Smokeless tobacco: Never Used   Substance Use Topics    Alcohol use: Yes     Comment: Occasional    Drug use: Never     Family History   Problem Relation Age of Onset    Diabetes Mother     Diabetes Sister     Diabetes Maternal Grandmother     Diabetes Maternal Grandfather     Heart Attack Maternal Grandfather        REVIEW OF SYSTEMS:    A 14 point review of systems was conducted, significant findings as noted in HPI. All other systems negative. PHYSICAL EXAM:    There were no vitals filed for this visit.     General appearance: alert, no acute distress, grooming appropriate  Eyes: EOMI, no scleral icterus  Neck: trachea midline, no JVD  Chest/Lungs: normal effort, no adventitious breath sounds, onRA  Cardiovascular: RRR  Abdomen: soft, non-tender, non-distended  Skin: warm and dry, no rashes, incision is well healed, no erythema or seroma  Extremities: no edema, no cyanosis  Neuro: A&Ox3, no focal deficits, sensation intact    PATHOLOGY  Department of Pathology   FINAL SURGICAL PATHOLOGY REPORT   Patient Name: Emilia Valero               Accession No:  [id number]    Age Sex:   1985    36 Y / F      Location:      [facility]   Account No:   [de-identified]                 Collected:     2022   Med Rec No:    [id number]                Received:      2022   Attend Phys: Kamila Modi MD            Completed:     2022   Perform Phys: Kamila Modi MD     FINAL DIAGNOSIS:     Right upper back soft tissue mass, excision:   - Lipoma.    NESBL/NESBL     ASSESSMENT AND PLAN:  This is a 39 y.o. female who presents for follow up after excision of soft-tissue mass on . The wound is healing well and she has no complaints. Follow up PRN.     Tavares Mcdonald

## 2022-06-01 ENCOUNTER — OFFICE VISIT (OUTPATIENT)
Dept: ORTHOPEDIC SURGERY | Age: 37
End: 2022-06-01
Payer: COMMERCIAL

## 2022-06-01 VITALS — HEIGHT: 62 IN | BODY MASS INDEX: 26.53 KG/M2 | WEIGHT: 144.18 LBS

## 2022-06-01 DIAGNOSIS — S16.1XXD STRAIN OF NECK MUSCLE, SUBSEQUENT ENCOUNTER: ICD-10-CM

## 2022-06-01 DIAGNOSIS — G56.02 CARPAL TUNNEL SYNDROME OF LEFT WRIST: ICD-10-CM

## 2022-06-01 DIAGNOSIS — M25.532 LEFT WRIST PAIN: ICD-10-CM

## 2022-06-01 PROCEDURE — 99214 OFFICE O/P EST MOD 30 MIN: CPT | Performed by: INTERNAL MEDICINE

## 2022-06-01 PROCEDURE — L3908 WHO COCK-UP NONMOLDE PRE OTS: HCPCS | Performed by: INTERNAL MEDICINE

## 2022-06-01 NOTE — PROGRESS NOTES
Chief Complaint:   Chief Complaint   Patient presents with    Neck Pain     much improved since taking steroid pack, waited to take until after lipoma excision on 5/5/22, but had a flare of pain in the meantime, which pt noticed happens after gardening; significant improvement with steroids, TR MRI          History of Present Illness:       Patient is a 39 y.o. female returns follow up for the above complaint. The patient was last seen approximately 5 weeksago. The symptoms are improving since the last visit. The patient has had no further testing for the problem. MRI completed in the interim. good response to the trial of Steroids    Neck:leg pain 80: 20. Neck pain aching or sharp in quality left-sided and only intermittent. She is experiencing tingling in the index and long fingers left hand that seems to correlate with increased physical activity levels with gardening especially     Pain levels:3. Shedenies new onset or progressive weakness of the upper extremities. She denies new onset gain disturbance. No reliance on NSAIDs or other analgesics     Past Medical History:        Past Medical History:   Diagnosis Date    Anemia     Anxiety     Migraine         Present Medications:         Current Outpatient Medications   Medication Sig Dispense Refill    methylPREDNISolone (MEDROL, SOPHIA,) 4 MG tablet By mouth. 1 kit 0    cyclobenzaprine (FLEXERIL) 10 MG tablet Take 1 tablet by mouth nightly as needed for Muscle spasms 30 tablet 1    ibuprofen (ADVIL;MOTRIN) 600 MG tablet Take 1 tablet by mouth 3 times daily as needed for Pain       No current facility-administered medications for this visit. Allergies: Allergies   Allergen Reactions    Latex Hives           Review of Systems:    Pertinent items are noted in HPI       Vital Signs: There were no vitals filed for this visit.      General Exam:     Constitutional: Patient is adequately groomed with no evidence of malnutrition    Physical Exam: Cervical neck      Primary Exam:    Inspection: No deformity atrophy appreciable curvature      Palpation: No focal trigger point tenderness      Range of Motion: Full range and symmetric without pain      Strength: Normal APB      Special Tests: Tinel's negative, Phalen's equivocal      Skin: There are no rashes, ulcerations or lesions. Gait: Non-ataxic     Neurovascular - non focal and intact       Additional Comments:        Additional Examinations:                   Office Imaging Results/Procedures PerformedToday:             Office Procedures:     Orders Placed This Encounter   Procedures    Breg Hull Wrist Brace Short     Patient was prescribed a Breg Universal Short Wrist brace . The left wrist will require stabilization / immobilization from this semi-rigid / rigid orthosis to improve their function. The orthosis will assist in protecting the affected area, provide functional support and facilitate healing. The patient was educated and fit by a healthcare professional with expert knowledge and specialization in brace application while under the direct supervision of the treating physician. Verbal and written instructions for the use of and application of this item were provided. They were instructed to contact the office immediately should the brace result in increased pain, decreased sensation, increased swelling or worsening of the condition. Other Outside Imaging and Testing Personally Reviewed:      Narrative   Site: [employer] [facility] #: [id number] #: [id number] Procedure: MR Cervical Spine w/o Contrast ; Reason for Exam: STRAIN OF NECK MUSCLE   This document is confidential medical information.  Unauthorized disclosure or use of this information is prohibited by law. If you are not the intended recipient of this document, please advise us by calling immediately [phone]. [address] [facility]           Patient Name: Loring Dubin   Case ID: [id number]   Patient : 1985   Referring Physician: Kalyn Leavitt MD   Exam Date: 2022   Exam Description: MR Cervical Spine w/o Contrast            HISTORY:   Neck pain       TECHNICAL FACTORS:  Long- and short-axis fat- and water-weighted images were performed.       COMPARISON:  None.       FINDINGS:   C2-3, C3-4 and C4-5 levels show no evidence of disc herniation or spinal stenosis.     The neural foramina patent. C5-6 level shows disc desiccation, concentric bulging disc without focal disc herniation.  The    neural foramina patent. C6-7 level shows disc desiccation, concentric bulging disc without focal disc herniation.  The    neural foramina patent. C7-T1 and T1-2 levels are normal.   Cervical cord and craniocervical junction are normal.   Paravertebral soft tissues are normal.   No fracture dislocation identified.       CONCLUSION:   1. Cervical spondylosis without evidence of cervical disc herniation or cervical spinal    stenosis or cervical cord compression. 2. Normal cervical cord and craniocervical junction               Thank you for the opportunity to provide your interpretation.               Gwen Suarez MD                 Assessment   Impression: . Encounter Diagnoses   Name Primary?  Strain of neck muscle, subsequent encounter     Carpal tunnel syndrome of left wrist     Left wrist pain               Plan:       Continue cervical stabilization normal prescribed program consider formal course of PT. Local measures for symptom control of neck pain only.   Use of NSAIDs which are precaution  Nocturnal wrist splinting and median nerve glides  Consider ultrasound-guided median nerve Hydro dissection at the left wrist as needed    [id number] minutes was spent related to previewing pertinent medical documentation prior to the patient's visit along with counseling during the patient's visit with respect

## 2023-05-30 SDOH — HEALTH STABILITY: PHYSICAL HEALTH: ON AVERAGE, HOW MANY MINUTES DO YOU ENGAGE IN EXERCISE AT THIS LEVEL?: 90 MIN

## 2023-05-30 SDOH — HEALTH STABILITY: PHYSICAL HEALTH: ON AVERAGE, HOW MANY DAYS PER WEEK DO YOU ENGAGE IN MODERATE TO STRENUOUS EXERCISE (LIKE A BRISK WALK)?: 7 DAYS

## 2023-05-30 ASSESSMENT — SOCIAL DETERMINANTS OF HEALTH (SDOH)

## 2023-05-31 ENCOUNTER — OFFICE VISIT (OUTPATIENT)
Dept: FAMILY MEDICINE CLINIC | Age: 38
End: 2023-05-31
Payer: COMMERCIAL

## 2023-05-31 VITALS
HEIGHT: 62 IN | SYSTOLIC BLOOD PRESSURE: 102 MMHG | OXYGEN SATURATION: 95 % | DIASTOLIC BLOOD PRESSURE: 72 MMHG | WEIGHT: 134 LBS | HEART RATE: 88 BPM | BODY MASS INDEX: 24.66 KG/M2

## 2023-05-31 DIAGNOSIS — N89.8 VAGINAL DISCHARGE: ICD-10-CM

## 2023-05-31 DIAGNOSIS — R87.610 ATYPICAL SQUAMOUS CELLS OF UNDETERMINED SIGNIFICANCE ON CYTOLOGIC SMEAR OF CERVIX (ASC-US): ICD-10-CM

## 2023-05-31 DIAGNOSIS — Z30.432 ENCOUNTER FOR IUD REMOVAL: ICD-10-CM

## 2023-05-31 DIAGNOSIS — N93.9 ABNORMAL VAGINAL BLEEDING: ICD-10-CM

## 2023-05-31 DIAGNOSIS — F41.9 ANXIETY: Primary | ICD-10-CM

## 2023-05-31 PROCEDURE — 99203 OFFICE O/P NEW LOW 30 MIN: CPT | Performed by: NURSE PRACTITIONER

## 2023-05-31 RX ORDER — ESCITALOPRAM OXALATE 10 MG/1
10 TABLET ORAL DAILY
Qty: 30 TABLET | Refills: 3 | Status: SHIPPED | OUTPATIENT
Start: 2023-05-31

## 2023-05-31 SDOH — ECONOMIC STABILITY: HOUSING INSECURITY
IN THE LAST 12 MONTHS, WAS THERE A TIME WHEN YOU DID NOT HAVE A STEADY PLACE TO SLEEP OR SLEPT IN A SHELTER (INCLUDING NOW)?: NO

## 2023-05-31 SDOH — ECONOMIC STABILITY: FOOD INSECURITY: WITHIN THE PAST 12 MONTHS, YOU WORRIED THAT YOUR FOOD WOULD RUN OUT BEFORE YOU GOT MONEY TO BUY MORE.: NEVER TRUE

## 2023-05-31 SDOH — ECONOMIC STABILITY: FOOD INSECURITY: WITHIN THE PAST 12 MONTHS, THE FOOD YOU BOUGHT JUST DIDN'T LAST AND YOU DIDN'T HAVE MONEY TO GET MORE.: NEVER TRUE

## 2023-05-31 SDOH — ECONOMIC STABILITY: INCOME INSECURITY: HOW HARD IS IT FOR YOU TO PAY FOR THE VERY BASICS LIKE FOOD, HOUSING, MEDICAL CARE, AND HEATING?: NOT HARD AT ALL

## 2023-05-31 ASSESSMENT — PATIENT HEALTH QUESTIONNAIRE - PHQ9
SUM OF ALL RESPONSES TO PHQ QUESTIONS 1-9: 0
1. LITTLE INTEREST OR PLEASURE IN DOING THINGS: 0
2. FEELING DOWN, DEPRESSED OR HOPELESS: 0
SUM OF ALL RESPONSES TO PHQ QUESTIONS 1-9: 0
SUM OF ALL RESPONSES TO PHQ9 QUESTIONS 1 & 2: 0

## 2023-05-31 ASSESSMENT — ANXIETY QUESTIONNAIRES
3. WORRYING TOO MUCH ABOUT DIFFERENT THINGS: 3
6. BECOMING EASILY ANNOYED OR IRRITABLE: 3
2. NOT BEING ABLE TO STOP OR CONTROL WORRYING: 3
4. TROUBLE RELAXING: 3
7. FEELING AFRAID AS IF SOMETHING AWFUL MIGHT HAPPEN: 1
GAD7 TOTAL SCORE: 19
1. FEELING NERVOUS, ANXIOUS, OR ON EDGE: 3
IF YOU CHECKED OFF ANY PROBLEMS ON THIS QUESTIONNAIRE, HOW DIFFICULT HAVE THESE PROBLEMS MADE IT FOR YOU TO DO YOUR WORK, TAKE CARE OF THINGS AT HOME, OR GET ALONG WITH OTHER PEOPLE: EXTREMELY DIFFICULT
5. BEING SO RESTLESS THAT IT IS HARD TO SIT STILL: 3

## 2023-05-31 ASSESSMENT — ENCOUNTER SYMPTOMS: ABDOMINAL PAIN: 0

## 2023-05-31 NOTE — PATIENT INSTRUCTIONS
You may receive a survey regarding the care you received during your visit. Your input is valuable to us. We encourage you to complete and return your survey. We hope you will choose us in the future for your healthcare needs. GENERAL OFFICE POLICIES      Telephone Calls: Messages will be answered within 1-2 business days, unless the provider is out of the office. If it is urgent a covering provider will answer. (this does not include Medication refills). MyChart: We recommend all patients sign up for [employer]. Through this portal you can see your lab results, request refills, schedule appointments, pay your bill and send messages to the office. [employer] messages will be answered within 1-2 business days unless the provider is out of the office. For urgent matters, please call the office. Appointments:  All appointments must be scheduled. We ask all patients to schedule their next follow up appointment before they leave the office to make sure you will be able to be seen before you run out of medications. 24 hours notice is required to cancel or reschedule an appointment to avoid being marked as a no show. You may be dismissed from the practice after 3 no shows. LATE for Appointment: If you are 15 or more minutes late for your appointment, you may be asked to reschedule. MA/LAB APPTS: Must be scheduled, cannot accept walk in lab visits. We only draw labs for patients established in our office. We only do injections for medications ordered by our office. Acute Sick Visits:  Nothing other than acute complaint will be addressed at this visit. TRADITIONAL MEDICARE  DOES NOT COVER PHYSICALS  MEDICARE WELLNESS VISITS: These are NOT physicals but the free annual visit offered by Medicare to discuss wellness issues. Medication refills, checkups, etc. will not be addressed during this visit.   Medication Refills: Refills are handled electronically so please contact your pharmacy for medication refills

## 2023-05-31 NOTE — PROGRESS NOTES
disturbance and suicidal ideas. The patient is nervous/anxious. Physical Exam  Vitals reviewed. Constitutional:       General: She is awake. Appearance: Normal appearance. Cardiovascular:      Rate and Rhythm: Normal rate and regular rhythm. Heart sounds: Normal heart sounds, S1 normal and S2 normal.   Pulmonary:      Effort: Pulmonary effort is normal.      Breath sounds: Normal breath sounds and air entry. Skin:     General: Skin is warm and dry. Neurological:      General: No focal deficit present. Mental Status: She is alert and oriented to person, place, and time. Mental status is at baseline. Psychiatric:         Attention and Perception: Attention and perception normal.         Mood and Affect: Mood and affect normal.         Speech: Speech normal.         Behavior: Behavior normal. Behavior is cooperative. Thought Content: Thought content normal.         Cognition and Memory: Cognition and memory normal.         Judgment: Judgment normal.       Pau was seen today for new patient. Diagnoses and all orders for this visit:    Anxiety  DESTINEE 7 SCORE 5/31/2023   DESTINEE-7 Total Score 19   Interpretation of DESTINEE-7 score: 5-9 = mild anxiety, 10-14 = moderate anxiety, 15+ = severe anxiety. Recommend referral to behavioral health for scores 10 or greater. -     escitalopram (LEXAPRO) 10 MG tablet;  Take 1 tablet by mouth daily SE DW PT  ADVISED THAT IT MAY TAKE SIX TO EIGHT WEEKS BEFORE SHE NOTICES ANY IMPROVEMENT IN HER MOOD  ENCOURAGED TO TRY RELAXATION TECHNIQUES  STOP LEXAPRO IF SHE HAS ANY SI/HI THOUGHTS    Abnormal vaginal bleeding  Vaginal discharge  ADVISED THAT BV IS NOT AN STI -  ORIC ACID SUPPOSITORIES KAYLIN TP  AS WELL AS WEARING COTTON BRIEFS  DAILY PROBIOTIC    Vaginal discharge  Atypical squamous cells of undetermined significance on cytologic smear of cervix (ASC-US)  Encounter for IUD removal   External Referral To Obstetrics & Gynecology DR GUILLAUME      Return in

## 2023-08-02 ENCOUNTER — OFFICE VISIT (OUTPATIENT)
Dept: FAMILY MEDICINE CLINIC | Age: 38
End: 2023-08-02
Payer: COMMERCIAL

## 2023-08-02 VITALS
HEIGHT: 62 IN | HEART RATE: 84 BPM | SYSTOLIC BLOOD PRESSURE: 96 MMHG | WEIGHT: 132 LBS | DIASTOLIC BLOOD PRESSURE: 60 MMHG | BODY MASS INDEX: 24.29 KG/M2

## 2023-08-02 DIAGNOSIS — F41.9 ANXIETY: Primary | ICD-10-CM

## 2023-08-02 DIAGNOSIS — Z13.220 SCREENING FOR HYPERLIPIDEMIA: ICD-10-CM

## 2023-08-02 DIAGNOSIS — Z13.1 DIABETES MELLITUS SCREENING: ICD-10-CM

## 2023-08-02 DIAGNOSIS — N93.9 ABNORMAL VAGINAL BLEEDING: ICD-10-CM

## 2023-08-02 LAB — TSH SERPL DL<=0.005 MIU/L-ACNC: 0.85 UIU/ML (ref 0.27–4.2)

## 2023-08-02 PROCEDURE — 99213 OFFICE O/P EST LOW 20 MIN: CPT | Performed by: NURSE PRACTITIONER

## 2023-08-02 RX ORDER — ETONOGESTREL AND ETHINYL ESTRADIOL 11.7; 2.7 MG/1; MG/1
INSERT, EXTENDED RELEASE VAGINAL
COMMUNITY
Start: 2023-07-26

## 2023-08-02 ASSESSMENT — ANXIETY QUESTIONNAIRES
GAD7 TOTAL SCORE: 5
6. BECOMING EASILY ANNOYED OR IRRITABLE: 1
3. WORRYING TOO MUCH ABOUT DIFFERENT THINGS: 1
1. FEELING NERVOUS, ANXIOUS, OR ON EDGE: 1
2. NOT BEING ABLE TO STOP OR CONTROL WORRYING: 1
5. BEING SO RESTLESS THAT IT IS HARD TO SIT STILL: 0
7. FEELING AFRAID AS IF SOMETHING AWFUL MIGHT HAPPEN: 0
4. TROUBLE RELAXING: 1
IF YOU CHECKED OFF ANY PROBLEMS ON THIS QUESTIONNAIRE, HOW DIFFICULT HAVE THESE PROBLEMS MADE IT FOR YOU TO DO YOUR WORK, TAKE CARE OF THINGS AT HOME, OR GET ALONG WITH OTHER PEOPLE: SOMEWHAT DIFFICULT

## 2023-08-03 LAB
ALBUMIN SERPL-MCNC: 4.6 G/DL (ref 3.4–5)
ALBUMIN/GLOB SERPL: 1.9 {RATIO} (ref 1.1–2.2)
ALP SERPL-CCNC: 44 U/L (ref 40–129)
ALT SERPL-CCNC: 7 U/L (ref 10–40)
ANION GAP SERPL CALCULATED.3IONS-SCNC: 11 MMOL/L (ref 3–16)
AST SERPL-CCNC: 15 U/L (ref 15–37)
BILIRUB SERPL-MCNC: 1 MG/DL (ref 0–1)
BUN SERPL-MCNC: 9 MG/DL (ref 7–20)
CALCIUM SERPL-MCNC: 9.1 MG/DL (ref 8.3–10.6)
CHLORIDE SERPL-SCNC: 104 MMOL/L (ref 99–110)
CHOLEST SERPL-MCNC: 214 MG/DL (ref 0–199)
CO2 SERPL-SCNC: 25 MMOL/L (ref 21–32)
CREAT SERPL-MCNC: 0.6 MG/DL (ref 0.6–1.1)
GFR SERPLBLD CREATININE-BSD FMLA CKD-EPI: >60 ML/MIN/{1.73_M2}
GLUCOSE SERPL-MCNC: 88 MG/DL (ref 70–99)
HDLC SERPL-MCNC: 80 MG/DL (ref 40–60)
LDL CHOLESTEROL CALCULATED: 124 MG/DL
POTASSIUM SERPL-SCNC: 3.8 MMOL/L (ref 3.5–5.1)
PROT SERPL-MCNC: 7 G/DL (ref 6.4–8.2)
SODIUM SERPL-SCNC: 140 MMOL/L (ref 136–145)
TRIGL SERPL-MCNC: 48 MG/DL (ref 0–150)
VLDLC SERPL CALC-MCNC: 10 MG/DL

## 2023-08-28 ENCOUNTER — APPOINTMENT (OUTPATIENT)
Dept: ULTRASOUND IMAGING | Age: 38
End: 2023-08-28
Payer: COMMERCIAL

## 2023-08-28 ENCOUNTER — HOSPITAL ENCOUNTER (EMERGENCY)
Age: 38
Discharge: HOME | End: 2023-08-28
Payer: COMMERCIAL

## 2023-08-28 ENCOUNTER — TELEPHONE (OUTPATIENT)
Dept: FAMILY MEDICINE CLINIC | Age: 38
End: 2023-08-28

## 2023-08-28 VITALS
OXYGEN SATURATION: 100 % | RESPIRATION RATE: 18 BRPM | TEMPERATURE: 98.4 F | HEART RATE: 87 BPM | DIASTOLIC BLOOD PRESSURE: 56 MMHG | SYSTOLIC BLOOD PRESSURE: 119 MMHG

## 2023-08-28 DIAGNOSIS — D21.9 FIBROID TUMOR: ICD-10-CM

## 2023-08-28 DIAGNOSIS — N93.8 DUB (DYSFUNCTIONAL UTERINE BLEEDING): Primary | ICD-10-CM

## 2023-08-28 LAB
ALBUMIN SERPL-MCNC: 4.3 G/DL (ref 3.4–5)
ALBUMIN/GLOB SERPL: 1.5 {RATIO} (ref 1.1–2.2)
ALP SERPL-CCNC: 42 U/L (ref 40–129)
ALT SERPL-CCNC: 7 U/L (ref 10–40)
ANION GAP SERPL CALCULATED.3IONS-SCNC: 10 MMOL/L (ref 3–16)
AST SERPL-CCNC: 14 U/L (ref 15–37)
BACTERIA URNS QL MICRO: ABNORMAL /HPF
BASOPHILS # BLD: 0 K/UL (ref 0–0.2)
BASOPHILS NFR BLD: 0.3 %
BILIRUB SERPL-MCNC: 1.2 MG/DL (ref 0–1)
BILIRUB UR QL STRIP.AUTO: ABNORMAL
BUN SERPL-MCNC: 10 MG/DL (ref 7–20)
CALCIUM SERPL-MCNC: 9.3 MG/DL (ref 8.3–10.6)
CHLORIDE SERPL-SCNC: 106 MMOL/L (ref 99–110)
CLARITY UR: ABNORMAL
CO2 SERPL-SCNC: 25 MMOL/L (ref 21–32)
COLOR UR: ABNORMAL
CREAT SERPL-MCNC: 0.5 MG/DL (ref 0.6–1.1)
DEPRECATED RDW RBC AUTO: 12.6 % (ref 12.4–15.4)
EOSINOPHIL # BLD: 0.1 K/UL (ref 0–0.6)
EOSINOPHIL NFR BLD: 2.2 %
EPI CELLS #/AREA URNS AUTO: 8 /HPF (ref 0–5)
GFR SERPLBLD CREATININE-BSD FMLA CKD-EPI: >60 ML/MIN/{1.73_M2}
GLUCOSE SERPL-MCNC: 88 MG/DL (ref 70–99)
GLUCOSE UR STRIP.AUTO-MCNC: NEGATIVE MG/DL
HCG SERPL QL: NEGATIVE
HCT VFR BLD AUTO: 36.6 % (ref 36–48)
HGB BLD-MCNC: 12.2 G/DL (ref 12–16)
HGB UR QL STRIP.AUTO: ABNORMAL
HYALINE CASTS #/AREA URNS AUTO: 2 /LPF (ref 0–8)
KETONES UR STRIP.AUTO-MCNC: 15 MG/DL
LEUKOCYTE ESTERASE UR QL STRIP.AUTO: NEGATIVE
LYMPHOCYTES # BLD: 2.4 K/UL (ref 1–5.1)
LYMPHOCYTES NFR BLD: 37.5 %
MCH RBC QN AUTO: 32 PG (ref 26–34)
MCHC RBC AUTO-ENTMCNC: 33.3 G/DL (ref 31–36)
MCV RBC AUTO: 96.1 FL (ref 80–100)
MONOCYTES # BLD: 0.4 K/UL (ref 0–1.3)
MONOCYTES NFR BLD: 6.8 %
NEUTROPHILS # BLD: 3.4 K/UL (ref 1.7–7.7)
NEUTROPHILS NFR BLD: 53.2 %
NITRITE UR QL STRIP.AUTO: NEGATIVE
PH UR STRIP.AUTO: 5.5 [PH] (ref 5–8)
PLATELET # BLD AUTO: 134 K/UL (ref 135–450)
PMV BLD AUTO: 10 FL (ref 5–10.5)
POTASSIUM SERPL-SCNC: 4 MMOL/L (ref 3.5–5.1)
PROT SERPL-MCNC: 7.1 G/DL (ref 6.4–8.2)
PROT UR STRIP.AUTO-MCNC: ABNORMAL MG/DL
RBC # BLD AUTO: 3.81 M/UL (ref 4–5.2)
RBC CLUMPS #/AREA URNS AUTO: 3 /HPF (ref 0–4)
SODIUM SERPL-SCNC: 141 MMOL/L (ref 136–145)
SP GR UR STRIP.AUTO: >=1.03 (ref 1–1.03)
UA COMPLETE W REFLEX CULTURE PNL UR: ABNORMAL
UA DIPSTICK W REFLEX MICRO PNL UR: YES
URN SPEC COLLECT METH UR: ABNORMAL
UROBILINOGEN UR STRIP-ACNC: 0.2 E.U./DL
WBC # BLD AUTO: 6.4 K/UL (ref 4–11)
WBC #/AREA URNS AUTO: 1 /HPF (ref 0–5)

## 2023-08-28 PROCEDURE — 85025 COMPLETE CBC W/AUTO DIFF WBC: CPT

## 2023-08-28 PROCEDURE — 84703 CHORIONIC GONADOTROPIN ASSAY: CPT

## 2023-08-28 PROCEDURE — 81001 URINALYSIS AUTO W/SCOPE: CPT

## 2023-08-28 PROCEDURE — 80053 COMPREHEN METABOLIC PANEL: CPT

## 2023-08-28 PROCEDURE — 76830 TRANSVAGINAL US NON-OB: CPT

## 2023-08-28 PROCEDURE — 99284 EMERGENCY DEPT VISIT MOD MDM: CPT

## 2023-08-28 NOTE — TELEPHONE ENCOUNTER
Patient wants to know if she should go to urgent care, ER or come see Geroge Closs, she is bleeding heavy - this has been going on now for 8 days. Please give her a call back.

## 2023-08-28 NOTE — ED PROVIDER NOTES
[facility]        Pt Name: Raynard Goldberg  MRN: [id number]  [address] [facility] [place] 1985  Date of evaluation: 8/28/2023  Provider: Rachell Zuniga PA-C  PCP: ISMAEL Monroy CNP  Note Started: 11:13 AM EDT 8/28/23      SHONDA. I have evaluated this patient. CHIEF COMPLAINT       Chief Complaint   Patient presents with    Vaginal Bleeding     Patient in with complaints of vaginal bleeding that has been going on for nine months. Pcp advised to come in        HISTORY OF PRESENT ILLNESS: 1 or more Elements     History From: patient    Raynard Goldberg is a 44 y.o. female with past medical history of anemia, anxiety who presents complaining of vaginal bleeding abnormally for 9 months. Patient states she had irregular intermittent cycles in the past, had IUD placed. States IUD made the bleeding worse and had it removed 1 month ago. Since then the bleeding is more severe. She is not taking any medication for this currently. She does feel lightheaded in the last few days states she fell here. Denies syncope, fever, anticoagulation use, pregnancy. Patient is concerned about fibroids due to family history. [facility]. Nursing Notes were all reviewed and agreed with or any disagreements were addressed in the HPI. REVIEW OF SYSTEMS :      Review of Systems   All other systems reviewed and are negative. Positives and Pertinent negatives as per HPI. PAST MEDICAL HISTORY    has a past medical history of Anemia, Anxiety, and Migraine.      SURGICAL HISTORY     Past Surgical History:   Procedure Laterality Date    FALLOPIAN TUBE SURGERY Right 2011    tubal pregnancy    SKIN BIOPSY Right 05/04/2022    EXCISION OF SOFT TISSUE MASS OF RIGHT UPPER BACK performed by Priti Espinal MD at [address]       Discharge Medication List as of 8/28/2023  2:55 PM

## 2023-08-28 NOTE — TELEPHONE ENCOUNTER
CALLED AND SPOKE TO PATIENT AND ADVISED HER TO GO TO THE ER. BY THE TIME I CALLED, PATIENT ALREADY MADE THE DECISION TO GO TO THE ER.  SC

## 2023-09-21 ENCOUNTER — TELEPHONE (OUTPATIENT)
Dept: FAMILY MEDICINE CLINIC | Age: 38
End: 2023-09-21

## 2023-09-21 NOTE — TELEPHONE ENCOUNTER
----- Message from Sayra Montez sent at 9/21/2023  9:52 AM EDT -----  Subject: Appointment Request    Reason for Call: Established Patient Appointment needed: Routine Physical   Exam with PAP    QUESTIONS    Reason for appointment request? No appointments available during search     Additional Information for Provider? pt is needing to reschedule her   9/21/23 pap please give a call when opening  ---------------------------------------------------------------------------  --------------  [address]  [phone]; OK to leave message on voicemail  ---------------------------------------------------------------------------  --------------  SCRIPT ANSWERS

## 2023-11-27 RX ORDER — ESCITALOPRAM OXALATE 10 MG/1
10 TABLET ORAL DAILY
Qty: 30 TABLET | Refills: 3 | Status: SHIPPED | OUTPATIENT
Start: 2023-11-27

## 2023-12-07 ENCOUNTER — TELEPHONE (OUTPATIENT)
Dept: FAMILY MEDICINE CLINIC | Age: 38
End: 2023-12-07

## 2023-12-07 NOTE — TELEPHONE ENCOUNTER
----- Message from Jenny Hackett sent at 12/7/2023  2:21 PM EST -----  Subject: Appointment Request    Reason for Call: Established Patient Appointment needed: Routine ED Follow   Up Visit    QUESTIONS    Reason for appointment request? No appointments available during search     Additional Information for Provider? Patient needs to get a ED follow up   for Vaginal complications. She had fibroids that were creating bleeding   issues. with reoccurring yeast infections. Impacting . After sexual   encounter he got a yeast infection and had urinalysis which grew a   bacteria. Patient wants to take care of the issue so this will stop.    Patient wants a urinalysis or culture to identify what she has.  ---------------------------------------------------------------------------  --------------  Savannah PETERSEN  [phone]; OK to leave message on voicemail  ---------------------------------------------------------------------------  --------------  SCRIPT ANSWERS

## 2023-12-08 NOTE — TELEPHONE ENCOUNTER
CALLED AND SPOKE TO PATIENT AND ADVISED ON NANCY NOTE. PATIENT WAS VERY UNDERSTANDING AND DID NOT REALIZE THAT NANCY WAS NOT AN OB/GYN. SHE NOW FEELS BAD THAT SHE HAS BEEN SPEAKING TO [address] [place] [address] ABOUT ALL HER FEMININE PROBLEMS. SHE SAID SHE WILL REACH OUT TO HER GYN.  SC

## 2023-12-08 NOTE — TELEPHONE ENCOUNTER
If the concern is her fibroid -pain during intercourse she needs to follow-up with her gynecologist-if she wants to see if she has a UTI or yeast infection she can do an e-visit and come in for a lab visit

## 2024-04-04 LAB — PAP SMEAR, EXTERNAL: NORMAL

## 2024-09-20 SDOH — HEALTH STABILITY: PHYSICAL HEALTH: ON AVERAGE, HOW MANY DAYS PER WEEK DO YOU ENGAGE IN MODERATE TO STRENUOUS EXERCISE (LIKE A BRISK WALK)?: 5 DAYS

## 2024-09-20 SDOH — HEALTH STABILITY: PHYSICAL HEALTH: ON AVERAGE, HOW MANY MINUTES DO YOU ENGAGE IN EXERCISE AT THIS LEVEL?: 100 MIN

## 2024-09-23 ENCOUNTER — OFFICE VISIT (OUTPATIENT)
Dept: ORTHOPEDIC SURGERY | Age: 39
End: 2024-09-23
Payer: COMMERCIAL

## 2024-09-23 VITALS — BODY MASS INDEX: 24.3 KG/M2 | HEIGHT: 62 IN | WEIGHT: 132.06 LBS

## 2024-09-23 DIAGNOSIS — M54.12 RADICULITIS OF RIGHT CERVICAL REGION: ICD-10-CM

## 2024-09-23 DIAGNOSIS — M79.602 LEFT ARM PAIN: Primary | ICD-10-CM

## 2024-09-23 DIAGNOSIS — M50.20 CERVICAL DISCOGENIC PAIN SYNDROME: ICD-10-CM

## 2024-09-23 DIAGNOSIS — M50.30 DDD (DEGENERATIVE DISC DISEASE), CERVICAL: ICD-10-CM

## 2024-09-23 DIAGNOSIS — M47.812 CS (CERVICAL SPONDYLOSIS): ICD-10-CM

## 2024-09-23 PROCEDURE — 99214 OFFICE O/P EST MOD 30 MIN: CPT | Performed by: INTERNAL MEDICINE

## 2024-09-23 PROCEDURE — L3670 SO ACRO/CLAV CAN WEB PRE OTS: HCPCS | Performed by: INTERNAL MEDICINE

## 2024-09-23 RX ORDER — MELOXICAM 15 MG/1
15 TABLET ORAL DAILY PRN
Qty: 30 TABLET | Refills: 2 | Status: SHIPPED | OUTPATIENT
Start: 2024-09-23

## 2024-09-23 RX ORDER — METHYLPREDNISOLONE 4 MG
TABLET, DOSE PACK ORAL
Qty: 1 KIT | Refills: 0 | Status: SHIPPED | OUTPATIENT
Start: 2024-09-23

## 2024-09-23 RX ORDER — TIZANIDINE 2 MG/1
2 TABLET ORAL 3 TIMES DAILY PRN
Qty: 30 TABLET | Refills: 1 | Status: SHIPPED | OUTPATIENT
Start: 2024-09-23

## 2025-02-13 ENCOUNTER — OFFICE VISIT (OUTPATIENT)
Dept: ORTHOPEDIC SURGERY | Age: 40
End: 2025-02-13

## 2025-02-13 VITALS — BODY MASS INDEX: 24.29 KG/M2 | WEIGHT: 132 LBS | HEIGHT: 62 IN

## 2025-02-13 DIAGNOSIS — M50.222 DISPLACEMENT OF INTERVERTEBRAL DISC AT C5-C6 LEVEL: ICD-10-CM

## 2025-02-13 DIAGNOSIS — M54.12 RADICULITIS OF LEFT CERVICAL REGION: ICD-10-CM

## 2025-02-13 DIAGNOSIS — M50.223 HERNIATION OF INTERVERTEBRAL DISC AT C6-C7 LEVEL: ICD-10-CM

## 2025-02-13 DIAGNOSIS — M54.12 CERVICAL RADICULOPATHY AT C7: Primary | ICD-10-CM

## 2025-02-13 RX ORDER — MELOXICAM 15 MG/1
15 TABLET ORAL DAILY PRN
Qty: 30 TABLET | Refills: 2 | Status: SHIPPED | OUTPATIENT
Start: 2025-02-13

## 2025-02-13 RX ORDER — TIZANIDINE 2 MG/1
2 TABLET ORAL 3 TIMES DAILY PRN
Qty: 30 TABLET | Refills: 1 | Status: SHIPPED | OUTPATIENT
Start: 2025-02-13

## 2025-02-13 RX ORDER — GABAPENTIN 300 MG/1
300 CAPSULE ORAL NIGHTLY
Qty: 30 CAPSULE | Refills: 1 | Status: SHIPPED | OUTPATIENT
Start: 2025-02-13 | End: 2025-04-14

## 2025-02-13 NOTE — PROGRESS NOTES
Chief Complaint:   Chief Complaint   Patient presents with    Shoulder Pain     Left Shoulder - MRI results today. Medrol with 99% relief until about 2 weeks ago. Pain is back and severe. Numbness and pins needles in arm and fingers. Unable to move arm with pain/ numbness.          History of Present Illness:       Patient is a 39 y.o. female returns follow up for the above complaint. The patient was last seen approximately 5 monthsago and lost to follow-up. The symptoms resurfaced approximately 2 weeks ago unprompted by any specific injury or event.. The patient has had further testing for the problem.      MRI completed in the interim.    good response to the trial of Medrol prescribed at the time of her last visit    Neck:Left arm pain 10: 90. Pain in neck and arm is stabbing or aching numbness and pins-and-needles in quality.  Upper limb symptoms following a C7 dermatomal distribution radiating into the hand.    Pain levels:6 and are moderately disabling.     The patient denies new onset or progressive weakness of the upper extremities.  The patient denies new onset gait disturbance.    She continues on medical pain management as per previous  inclusive of NSAID- , muscle relaxant-    She continues with sling immobilization of the left upper extremity for comfort         Past Medical History:        Past Medical History:   Diagnosis Date    Anemia     Anxiety     Migraine         Present Medications:         Current Outpatient Medications   Medication Sig Dispense Refill    meloxicam (MOBIC) 15 MG tablet Take 1 tablet by mouth daily as needed for Pain Start after completing Medrol 30 tablet 2    tiZANidine (ZANAFLEX) 2 MG tablet Take 1 tablet by mouth 3 times daily as needed (Muscle tightness/spasm) 30 tablet 1    gabapentin (NEURONTIN) 300 MG capsule Take 1 capsule by mouth nightly for 60 days. Intended supply: 30 days 30 capsule 1    IRON, FERROUS SULFATE, PO Iron (ferrous sulfate)       No current

## 2025-02-28 ENCOUNTER — HOSPITAL ENCOUNTER (OUTPATIENT)
Dept: INTERVENTIONAL RADIOLOGY/VASCULAR | Age: 40
Discharge: HOME | End: 2025-02-28
Attending: INTERNAL MEDICINE
Payer: COMMERCIAL

## 2025-02-28 DIAGNOSIS — M54.12 CERVICAL RADICULOPATHY: ICD-10-CM

## 2025-02-28 PROCEDURE — 62321 NJX INTERLAMINAR CRV/THRC: CPT

## 2025-02-28 PROCEDURE — 2709999900 HC NON-CHARGEABLE SUPPLY

## 2025-02-28 PROCEDURE — 6360000004 HC RX CONTRAST MEDICATION: Performed by: RADIOLOGY

## 2025-02-28 PROCEDURE — 6360000002 HC RX W HCPCS: Performed by: RADIOLOGY

## 2025-02-28 RX ORDER — IOPAMIDOL 408 MG/ML
INJECTION, SOLUTION INTRATHECAL PRN
Status: COMPLETED | OUTPATIENT
Start: 2025-02-28 | End: 2025-02-28

## 2025-02-28 RX ORDER — LIDOCAINE HYDROCHLORIDE 10 MG/ML
INJECTION, SOLUTION EPIDURAL; INFILTRATION; INTRACAUDAL; PERINEURAL PRN
Status: COMPLETED | OUTPATIENT
Start: 2025-02-28 | End: 2025-02-28

## 2025-02-28 RX ADMIN — IOPAMIDOL 1 ML: 408 INJECTION, SOLUTION INTRATHECAL at 10:46

## 2025-02-28 RX ADMIN — LIDOCAINE HYDROCHLORIDE 5 ML: 10 INJECTION, SOLUTION EPIDURAL; INFILTRATION; INTRACAUDAL; PERINEURAL at 10:45

## 2025-03-04 ASSESSMENT — PATIENT HEALTH QUESTIONNAIRE - PHQ9
SUM OF ALL RESPONSES TO PHQ QUESTIONS 1-9: 0
2. FEELING DOWN, DEPRESSED OR HOPELESS: NOT AT ALL
SUM OF ALL RESPONSES TO PHQ QUESTIONS 1-9: 0
1. LITTLE INTEREST OR PLEASURE IN DOING THINGS: NOT AT ALL
SUM OF ALL RESPONSES TO PHQ QUESTIONS 1-9: 0
SUM OF ALL RESPONSES TO PHQ QUESTIONS 1-9: 0

## 2025-03-06 ASSESSMENT — PATIENT HEALTH QUESTIONNAIRE - PHQ9
SUM OF ALL RESPONSES TO PHQ9 QUESTIONS 1 & 2: 0
2. FEELING DOWN, DEPRESSED OR HOPELESS: NOT AT ALL
1. LITTLE INTEREST OR PLEASURE IN DOING THINGS: NOT AT ALL

## 2025-04-03 ENCOUNTER — OFFICE VISIT (OUTPATIENT)
Dept: FAMILY MEDICINE CLINIC | Age: 40
End: 2025-04-03
Payer: COMMERCIAL

## 2025-04-03 VITALS
HEIGHT: 62 IN | BODY MASS INDEX: 25.4 KG/M2 | WEIGHT: 138 LBS | HEART RATE: 87 BPM | SYSTOLIC BLOOD PRESSURE: 102 MMHG | DIASTOLIC BLOOD PRESSURE: 60 MMHG | OXYGEN SATURATION: 98 %

## 2025-04-03 DIAGNOSIS — Z13.220 SCREENING FOR HYPERLIPIDEMIA: ICD-10-CM

## 2025-04-03 DIAGNOSIS — Z13.1 DIABETES MELLITUS SCREENING: ICD-10-CM

## 2025-04-03 DIAGNOSIS — Z12.31 ENCOUNTER FOR SCREENING MAMMOGRAM FOR MALIGNANT NEOPLASM OF BREAST: ICD-10-CM

## 2025-04-03 DIAGNOSIS — Z00.00 ANNUAL PHYSICAL EXAM: Primary | ICD-10-CM

## 2025-04-03 LAB
CHOLEST SERPL-MCNC: 233 MG/DL (ref 0–199)
GLUCOSE P FAST SERPL-MCNC: 97 MG/DL (ref 70–99)
HDLC SERPL-MCNC: 89 MG/DL (ref 40–60)
LDL CHOLESTEROL: 135 MG/DL
TRIGL SERPL-MCNC: 46 MG/DL (ref 0–150)
VLDLC SERPL CALC-MCNC: 9 MG/DL

## 2025-04-03 PROCEDURE — 36415 COLL VENOUS BLD VENIPUNCTURE: CPT | Performed by: NURSE PRACTITIONER

## 2025-04-03 PROCEDURE — 99396 PREV VISIT EST AGE 40-64: CPT | Performed by: NURSE PRACTITIONER

## 2025-04-03 SDOH — ECONOMIC STABILITY: FOOD INSECURITY: WITHIN THE PAST 12 MONTHS, THE FOOD YOU BOUGHT JUST DIDN'T LAST AND YOU DIDN'T HAVE MONEY TO GET MORE.: NEVER TRUE

## 2025-04-03 SDOH — ECONOMIC STABILITY: FOOD INSECURITY: WITHIN THE PAST 12 MONTHS, YOU WORRIED THAT YOUR FOOD WOULD RUN OUT BEFORE YOU GOT MONEY TO BUY MORE.: NEVER TRUE

## 2025-04-03 NOTE — PROGRESS NOTES
History and Physical      Pau Turner  YOB: 1985    Date of Service:  4/3/2025    Chief Complaint:   Pau Turner is a 39 y.o. female who presents for complete physical examination.    HPI: ANNUAL EXAM SHE IS FASTING TODAY    Wt Readings from Last 3 Encounters:   02/13/25 59.9 kg (132 lb)   09/23/24 59.9 kg (132 lb 0.9 oz)   08/02/23 59.9 kg (132 lb)     BP Readings from Last 3 Encounters:   08/28/23 (!) 119/56   08/02/23 96/60   05/31/23 102/72       Patient Active Problem List   Diagnosis    Mass on back    Neck pain    Left shoulder pain    Menorrhagia due to intrauterine device (IUD)    Soft tissue mass       Preventive Care:  Health Maintenance   Topic Date Due    Polio vaccine (2 of 3 - 4-dose series) 08/07/1990    Cervical cancer screen  02/10/2024    COVID-19 Vaccine (4 - 2024-25 season) 09/01/2024    Breast cancer screen  Never done    Depression Screen  03/04/2026    Lipids  08/02/2028    DTaP/Tdap/Td vaccine (6 - Td or Tdap) 10/20/2030    Hepatitis B vaccine  Completed    Flu vaccine  Completed    Hepatitis A vaccine  Aged Out    Hib vaccine  Aged Out    HPV vaccine  Aged Out    Meningococcal (ACWY) vaccine  Aged Out    Meningococcal B vaccine  Aged Out    Pneumococcal 0-49 years Vaccine  Aged Out    Varicella vaccine  Discontinued    Hepatitis C screen  Discontinued    HIV screen  Discontinued      Hx abnormal PAP: no  Sexual activity: single partner, contraception - none   Self-breast exams: YES  Previous DEXA scan: N/A  Last eye exam: UNSURE, abnormal - FEAR SIGHTED  Exercise: CARDIO  Seatbelt use: YES  Lipid panel:   Lab Results   Component Value Date    HDL 80 (H) 08/02/2023        Advance Directive: N, <no information>    Immunization History   Administered Date(s) Administered    COVID-19, PFIZER PURPLE top, DILUTE for use, (age 12 y+), 30mcg/0.3mL 05/27/2021, 08/25/2021    COVID-19, PFIZER, 2024/25, (age 12y+), IM, 30mcg/0.3mL 10/02/2023    DT (pediatric) 08/22/1996    DTP 07/10/1990

## 2025-04-04 ENCOUNTER — RESULTS FOLLOW-UP (OUTPATIENT)
Dept: FAMILY MEDICINE CLINIC | Age: 40
End: 2025-04-04

## (undated) DEVICE — CLEANER,CAUTERY TIP,2X2",STERILE: Brand: MEDLINE

## (undated) DEVICE — SUTURE VCRL SZ 3-0 L18IN ABSRB UD POLYGLACTIN 910 BRAID TIE J910T

## (undated) DEVICE — SHEET,T,THYROID,STERILE: Brand: MEDLINE

## (undated) DEVICE — GENERAL: Brand: MEDLINE INDUSTRIES, INC.

## (undated) DEVICE — SUTURE ETHLN SZ 4-0 L18IN NONABSORBABLE BLK L19MM PS-2 3/8 1667H

## (undated) DEVICE — SYSTEM SKIN CLSR 22CM DERMBND PRINEO

## (undated) DEVICE — GOWN,SIRUS,POLYRNF,BRTHSLV,LG,30/CS: Brand: MEDLINE

## (undated) DEVICE — GLOVE SURG SZ 7 L12IN FNGR THK75MIL WHT LTX POLYMER BEAD

## (undated) DEVICE — TOWEL,STOP FLAG GOLD N-W: Brand: MEDLINE

## (undated) DEVICE — APPLICATOR PREP 26ML 0.7% IOD POVACRYLEX 74% ISO ALC ST

## (undated) DEVICE — SUTURE ETHLN SZ 3-0 L18IN NONABSORBABLE BLK PS-2 L19MM 3/8 1669H

## (undated) DEVICE — SUTURE VCRL SZ 3-0 L27IN ABSRB UD L26MM SH 1/2 CIR J416H

## (undated) DEVICE — ADHESIVE SKIN CLSR 0.7ML TOP DERMBND ADV

## (undated) DEVICE — SUTURE MCRYL SZ 4-0 L27IN ABSRB UD L19MM PS-2 1/2 CIR PRIM Y426H

## (undated) DEVICE — E-Z CLEAN, NON-STICK, PTFE COATED, ELECTROSURGICAL BLADE ELECTRODE, MODIFIED EXTENDED INSULATION, 2.5 INCH (6.35 CM): Brand: MEGADYNE